# Patient Record
Sex: MALE | Race: WHITE | NOT HISPANIC OR LATINO | Employment: OTHER | URBAN - METROPOLITAN AREA
[De-identification: names, ages, dates, MRNs, and addresses within clinical notes are randomized per-mention and may not be internally consistent; named-entity substitution may affect disease eponyms.]

---

## 2018-02-14 ENCOUNTER — OFFICE VISIT CONVERTED (OUTPATIENT)
Dept: SURGERY | Facility: CLINIC | Age: 63
End: 2018-02-14
Attending: NURSE PRACTITIONER

## 2018-04-10 ENCOUNTER — OFFICE VISIT CONVERTED (OUTPATIENT)
Dept: SURGERY | Facility: CLINIC | Age: 63
End: 2018-04-10
Attending: NURSE PRACTITIONER

## 2021-02-08 ENCOUNTER — OFFICE VISIT CONVERTED (OUTPATIENT)
Dept: SURGERY | Facility: CLINIC | Age: 66
End: 2021-02-08
Attending: NURSE PRACTITIONER

## 2021-02-08 ENCOUNTER — CONVERSION ENCOUNTER (OUTPATIENT)
Dept: SURGERY | Facility: CLINIC | Age: 66
End: 2021-02-08

## 2021-03-09 ENCOUNTER — HOSPITAL ENCOUNTER (OUTPATIENT)
Dept: VACCINE CLINIC | Facility: HOSPITAL | Age: 66
Discharge: HOME | End: 2021-03-09
Attending: INTERNAL MEDICINE

## 2021-03-30 ENCOUNTER — HOSPITAL ENCOUNTER (OUTPATIENT)
Dept: VACCINE CLINIC | Facility: HOSPITAL | Age: 66
Discharge: HOME | End: 2021-03-30
Attending: INTERNAL MEDICINE

## 2021-04-21 ENCOUNTER — HOSPITAL ENCOUNTER (OUTPATIENT)
Dept: PREADMISSION TESTING | Facility: HOSPITAL | Age: 66
Discharge: HOME | End: 2021-04-21
Attending: SURGERY

## 2021-04-21 LAB — SARS-COV-2 RNA SPEC QL NAA+PROBE: NOT DETECTED

## 2021-04-26 ENCOUNTER — HOSPITAL ENCOUNTER (OUTPATIENT)
Dept: GASTROENTEROLOGY | Facility: HOSPITAL | Age: 66
Setting detail: HOSPITAL OUTPATIENT SURGERY
Discharge: HOME | End: 2021-04-26
Attending: SURGERY

## 2021-05-10 NOTE — H&P
History and Physical      Patient Name: Ramon Holman   Patient ID: [id number]   Sex: Male   YOB: 1955    Primary Care Provider: Jason Larkin MD   Referring Provider: Jason Larkin MD    Visit Date: February 8, 2021    Provider: DULCE Cerrato   Location: [facility]   Location Address: [address] [place] [address]  [id number]   Location Phone: [phone]          Chief Complaint  · Age 50 or over  · Here today for a pre-surgical colon screening visit  · Difficulty Swallowing  · GERD  · Requesting EGD and Colonoscopy      History Of Present Illness  The patient is a 66 year old /White male presenting to the Surgical Specialist office on a referral from Jason Larkin MD.   Ramon Holman needs to have a diagnostic colonoscopy and EGD.   Patient states that they have had a colonoscopy. 10 years ago   Patient currently complains of: GERD and difficulty swallowing   Patient Does not have family history of colon cancer.      Patient presents today on referral from Dr. Jason Fuller.  Patient presents today with complaints of dysphagia, GERD and for screening colonoscopy.  Patient reports that he has previously been with some esophageal stenosis, that required some dilatation. Admits to feeling as if food gets stuck in his chest.  Denies any lower abdominal pain, diarrhea, or rectal bleeding.  Denies any family history of colorectal cancer.    3/18: EGD (Jonny): Esophagitis; gastritis.    3/15: EGD with dilation to 20mm - Stomach - mild reactive gastropathy.    4/12: EGD with dilation to 18mm - Gastric antrum - Slight reactive gastropathy with focal chronic inflammation; Gastric ulcer.     1/11: EGD & Colonoscopy (Jonny): Gastric Antrum - mild chronic reactive gastropathy. Normal colon.     2/04: EGD & Colonoscopy (Jonny): Gastritis; Diverticulosis.    6/03: EGD with removal of foreign body- food impaction removal & dilation of esophagus.       Past Medical  History  Disease Name Date Onset Notes   ***No Significant Medical History --  --    Arthritis --  --    High cholesterol --  --    Prostate Disorder --  --          Past Surgical History  Procedure Name Date Notes   Appendectomy --  --    Colonoscopy --  --    endoscopy 2015 --          Medication List  Name Date Started Instructions   aspirin 81 mg oral tablet,delayed release (DR/EC)  --    fenofibrate oral  --    Fish Oil oral  --    lovastatin 20 mg oral tablet  --    melatonin 5 mg oral capsule  --    multivitamin oral tablet  take 1 tablet by oral route daily   omeprazole 40 mg oral capsule,delayed release(DR/EC) 04/10/2018 take 1 capsule (40 mg) by oral route once daily before a meal for 30 days   Osteo Bi-Flex 250-200 mg oral tablet  --    Vitamin D2 50,000 unit oral capsule  --          Allergy List  Allergen Name Date Reaction Notes   NO KNOWN DRUG ALLERGIES --  --  --        Allergies Reconciled  Family Medical History  Disease Name Relative/Age Notes   -None  --    Family history of colon cancer Grandmother (maternal)/80s   Uncle; Grandmother (maternal)/80s         Social History  Finding Status Start/Stop Quantity Notes   Alcohol Never --/-- --  drinks no   Caffeine Current every day --/-- --  drinks regularly; 1-2 times per day  drinks regularly; coffee, tea and soft drinks; 3-4 times per day   Second hand smoke exposure Never --/-- --  no   Tobacco Never --/-- --  never a smoker         Review of Systems  · Constitutional  o Denies  o : fever, chills  · Eyes  o Denies  o : yellowish discoloration of eyes  · HENT  o Denies  o : difficulty swallowing  · Cardiovascular  o Denies  o : chest pain, chest pain on exertion  · Respiratory  o Denies  o : shortness of breath  · Gastrointestinal  o Admits  o : dysphagia, heartburn, reflux  o Denies  o : nausea, vomiting, diarrhea, constipation  · Genitourinary  o Denies  o : abnormal color of urine  · Integument  o Denies  o : rash  · Neurologic  o Denies  o :  tingling or numbness  · Musculoskeletal  o Denies  o : joint pain  · Endocrine  o Denies  o : weight gain, weight loss      Vitals  Date Time BP Position Site L\R Cuff Size HR RR TEMP (F) WT  HT  BMI kg/m2 BSA m2 O2 Sat FR L/min FiO2 HC       02/08/2021 10:00 AM       14  200lbs 0oz 6'   27.12 2.15             Physical Examination  · Constitutional  o Appearance  o : well developed, well-nourished, patient in no apparent distress  · Head and Face  o Head  o :   § Inspection  § : atraumatic, normocephalic  o Face  o :   § Inspection  § : no facial lesions  · Eyes  o Conjunctivae  o : conjunctivae normal  o Sclerae  o : sclerae white  · Neck  o Inspection/Palpation  o : normal appearance, no masses or tenderness, trachea midline  · Respiratory  o Respiratory Effort  o : breathing unlabored  · Skin and Subcutaneous Tissue  o General Inspection  o : no lesions present, no areas of discoloration, skin turgor normal, texture normal  · Neurologic  o Mental Status Examination  o :   § Orientation  § : grossly oriented to person, place and time  § Attention  § : attention normal, concentration abilities normal  § Fund of Knowledge  § : fund of knowledge within normal limits, patient aware of current events  o Gait and Station  o : normal gait, able to stand without difficulty  · Psychiatric  o Judgement and Insight  o : judgment and insight intact  o Mood and Affect  o : mood normal, affect appropriate              Assessment  · Difficulty in swallowing     787.20/R13.10  · GERD (gastroesophageal reflux disease)     530.81/K21.9  · Screening for colon cancer     V76.51/Z12.11  · Pre-op testing     V72.84/Z01.818    Problems Reconciled  Plan  · Orders  o Consent for Colonoscopy Screening-Possible risk/complications, benefits, and alternatives to surgical or invasive procedure have been explained to patient and/or legal guardian. -Patient has been evaluated and can tolerate anesthesia and/or sedation. Risks, benefits, and  alternatives to anesthesia and sedation have been explained to patient and/or legal guardian. () - 787.20/R13.10, 530.81/K21.9, V72.84/Z01.818, V76.51/Z12.11 - 04/26/2021  o Consent for Esophagogastroduodenoscopy (EGD) with dilation - Possible risks/complications, benefits, and alternatives to surgical or invasive procedure have been explained to patient and/or legal guardian. - Patient has been evaluated and can tolerate anesthesia and/or sedation. Risks, benefits, and alternatives to anesthesia and sedation have been explained to patient and/or legal guardian. ([id number]) - 787.20/R13.10, 530.81/K21.9, V72.84/Z01.818, V76.51/Z12.11 - 04/26/2021  o [facility] Covid-19 Screening ([id number]) - V72.84/Z01.818 - 04/21/2021   [address] @ 10am  · Medications  o Medications have been Reconciled  o Transition of Care or Provider Policy  · Instructions  o Surgical Facility: [facility]  o Handouts Provided Pre-Procedure Instructions including date, time, and location of procedure.   o PLAN: Proceeed with colonoscopy. Patient understands risks/benefits and is willing to proceed.   o PLAN: Proceeed with EGD. Patient understands risks/benefits and is willing to proceed.   o ***Surgical Orders***  o RISK AND BENEFITS:  o Given these options, the patient has verbally expressed an understanding of the risks of the surgery and finds these risks acceptable. Will proceed with surgery as soon as possible.  o O.R. PREP: Per protocol   o IV: Per Anesthesia  o Please sign permit for: Colonoscopy with possible biopsies by Dr. Perez.  o Please sign permit for: Esophagogastroduodenoscopy with possible biopsies and dilation by Dr. Perez.  o The above History and Physical Examination has been completed within 30 days of admission.  o ***Patient Status***  o Outpatient  o Follow up in the in the office post procedure.  o Electronically Identified Patient Education Materials Provided  Electronically            Electronically Signed by: DULCE Cerrato -Author on February 8, 2021 12:59:43 PM

## 2021-05-14 ENCOUNTER — OFFICE VISIT CONVERTED (OUTPATIENT)
Dept: SURGERY | Facility: CLINIC | Age: 66
End: 2021-05-14
Attending: NURSE PRACTITIONER

## 2021-05-14 VITALS — RESPIRATION RATE: 14 BRPM | WEIGHT: 200 LBS | BODY MASS INDEX: 27.09 KG/M2 | HEIGHT: 72 IN

## 2021-05-16 VITALS — BODY MASS INDEX: 26.41 KG/M2 | WEIGHT: 195 LBS | HEIGHT: 72 IN | RESPIRATION RATE: 14 BRPM

## 2021-05-16 VITALS — HEIGHT: 72 IN | RESPIRATION RATE: 14 BRPM | WEIGHT: 197.37 LBS | BODY MASS INDEX: 26.73 KG/M2

## 2021-06-05 NOTE — PROGRESS NOTES
Progress Note      Patient Name: Ramon Holman   Patient ID: [id number]   Sex: Male   YOB: 1955    Primary Care Provider: Jason Larkin MD   Referring Provider: Jason Larkin MD    Visit Date: May 14, 2021    Provider: DULCE Cerrato   Location: [facility]   Location Address: [address] [place] [address]  [id number]   Location Phone: [phone]          Chief Complaint  · Follow Up Colonoscopy and EGD      History Of Present Illness  Ramon Holman is a 66 year old /White male who is here to follow up colonoscopy and EGD.      Patient presents today for follow-up visit after undergoing a EGD and colonoscopy on 4/26/2021 performed by Dr. Isma Perez.  Patient with with reflux esophagitis per EGD/pathology.  Patient was with a normal colon per colonoscopy.  Patient denies any postoperative complications.       Past Medical History  Disease Name Date Onset Notes   Arthritis --  --    High cholesterol --  --    Prostate Disorder --  --          Past Surgical History  Procedure Name Date Notes   Appendectomy --  --    Colonoscopy --  --    endoscopy 2015 --          Medication List  Name Date Started Instructions   aspirin 81 mg oral tablet,delayed release (DR/EC)  --    famotidine 40 mg oral tablet 05/14/2021 take 1 tablet (40 mg) by oral route 2 times per day for 30 days   fenofibrate oral  --    Fish Oil oral  --    lovastatin 20 mg oral tablet  --    melatonin 5 mg oral capsule  --    multivitamin oral tablet  take 1 tablet by oral route daily   omeprazole 40 mg oral capsule,delayed release(DR/EC) 04/10/2018 take 1 capsule (40 mg) by oral route once daily before a meal for 30 days   Osteo Bi-Flex 250-200 mg oral tablet  --    Vitamin D2 50,000 unit oral capsule  --          Allergy List  Allergen Name Date Reaction Notes   NO KNOWN DRUG ALLERGIES --  --  --          Family Medical History  Disease Name Relative/Age Notes   -None  --    Family history of  colon cancer Grandmother (maternal)/80s   Uncle; Grandmother (maternal)/80s         Social History  Finding Status Start/Stop Quantity Notes   Alcohol Never --/-- --  drinks no   Caffeine Current every day --/-- --  drinks regularly; 1-2 times per day  drinks regularly; coffee, tea and soft drinks; 3-4 times per day   Second hand smoke exposure Never --/-- --  no   Tobacco Never --/-- --  never a smoker         Review of Systems  · Constitutional  o Denies  o : chills, fever  · Eyes  o Denies  o : yellowish discoloration of eyes  · HENT  o Denies  o : difficulty swallowing  · Cardiovascular  o Denies  o : chest pain on exertion  · Respiratory  o Denies  o : shortness of breath  · Gastrointestinal  o Denies  o : nausea, vomiting, diarrhea, constipation  · Genitourinary  o Denies  o : abnormal color of urine  · Integument  o Denies  o : rash  · Neurologic  o Denies  o : tingling or numbness  · Musculoskeletal  o Denies  o : joint pain  · Endocrine  o Denies  o : weight gain, weight loss      Vitals  Date Time BP Position Site L\R Cuff Size HR RR TEMP (F) WT  HT  BMI kg/m2 BSA m2 O2 Sat FR L/min FiO2        05/14/2021 11:37 AM       14  194lbs 2oz 6'   26.33 2.11             Physical Examination  · Constitutional  o Appearance  o : well developed, well-nourished, patient in no apparent distress  · Head and Face  o Head  o :   § Inspection  § : atraumatic, normocephalic  o Face  o :   § Inspection  § : no facial lesions  · Eyes  o Conjunctivae  o : conjunctivae normal  o Sclerae  o : sclerae white  · Neck  o Inspection/Palpation  o : normal appearance, no masses or tenderness, trachea midline  · Respiratory  o Respiratory Effort  o : breathing unlabored  · Skin and Subcutaneous Tissue  o General Inspection  o : no lesions present, no areas of discoloration, skin turgor normal, texture normal  · Neurologic  o Mental Status Examination  o :   § Orientation  § : grossly oriented to person, place and  time  § Attention  § : attention normal, concentration abilities normal  § Fund of Knowledge  § : fund of knowledge within normal limits, patient aware of current events  o Gait and Station  o : normal gait, able to stand without difficulty  · Psychiatric  o Judgement and Insight  o : judgment and insight intact  o Mood and Affect  o : mood normal, affect appropriate          Assessment  · S/P endoscopy     V45.89/Z98.890  · S/P colonoscopy     V45.89/Z98.890  · Reflux esophagitis     530.11/K21.00    Problems Reconciled  Plan  · Medications  o Medications have been Reconciled  o Transition of Care or Provider Policy  · Instructions  o Per the AGA guidelines of 2012 patient is to follow up for colonoscopy surveillance  o Rescreen: In 10 years follow-up in the interim as needed.  o Patient is concerned about increasing his omeprazole to 40 mg daily due to kidney function. I will try to add Pepcid to and use a dual therapy to try and control his GERD symptoms.   o Electronically Identified Patient Education Materials Provided Electronically  · Disposition  o Call or Return if symptoms worsen or persist.  o EMR dragon/transcription disclaimer: Much of this encounter note is an electronic transcription/translation of spoken language to printed text. Electronic translation of spoken language may permit erroneous, or at times nonsensical words or phrases to be inadvertently trasncribed; although I have reviewed the note for such errors, some may still exist.            Electronically Signed by: DULCE Cerrato -Author on May 14, 2021 01:32:20 PM

## 2021-07-15 VITALS — RESPIRATION RATE: 14 BRPM | BODY MASS INDEX: 26.29 KG/M2 | HEIGHT: 72 IN | WEIGHT: 194.12 LBS

## 2022-03-02 ENCOUNTER — TRANSCRIBE ORDERS (OUTPATIENT)
Dept: GENERAL RADIOLOGY | Facility: HOSPITAL | Age: 67
End: 2022-03-02

## 2022-03-02 ENCOUNTER — HOSPITAL ENCOUNTER (OUTPATIENT)
Dept: GENERAL RADIOLOGY | Facility: HOSPITAL | Age: 67
Discharge: HOME | End: 2022-03-02
Admitting: FAMILY MEDICINE

## 2022-03-02 DIAGNOSIS — M25.50 ARTHRALGIA, UNSPECIFIED JOINT: ICD-10-CM

## 2022-03-02 DIAGNOSIS — M25.50 ARTHRALGIA, UNSPECIFIED JOINT: Primary | ICD-10-CM

## 2022-03-02 PROCEDURE — 73600 X-RAY EXAM OF ANKLE: CPT

## 2022-11-03 ENCOUNTER — TRANSCRIBE ORDERS (OUTPATIENT)
Dept: ADMINISTRATIVE | Facility: HOSPITAL | Age: 67
End: 2022-11-03

## 2022-11-03 ENCOUNTER — HOSPITAL ENCOUNTER (OUTPATIENT)
Dept: GENERAL RADIOLOGY | Facility: HOSPITAL | Age: 67
Discharge: HOME | End: 2022-11-03

## 2022-11-03 DIAGNOSIS — M19.93 SECONDARY OSTEOARTHRITIS: Primary | ICD-10-CM

## 2022-11-03 DIAGNOSIS — M19.93 SECONDARY OSTEOARTHRITIS: ICD-10-CM

## 2022-11-03 PROCEDURE — 73562 X-RAY EXAM OF KNEE 3: CPT

## 2022-11-14 ENCOUNTER — PREP FOR SURGERY (OUTPATIENT)
Dept: OTHER | Facility: HOSPITAL | Age: 67
End: 2022-11-14

## 2022-11-14 ENCOUNTER — OFFICE VISIT (OUTPATIENT)
Dept: ORTHOPEDIC SURGERY | Facility: CLINIC | Age: 67
End: 2022-11-14

## 2022-11-14 VITALS — HEIGHT: 72 IN | WEIGHT: 194 LBS | BODY MASS INDEX: 26.28 KG/M2

## 2022-11-14 DIAGNOSIS — M17.0 BILATERAL PRIMARY OSTEOARTHRITIS OF KNEE: Primary | ICD-10-CM

## 2022-11-14 PROBLEM — M17.12 OSTEOARTHROSIS, LOCALIZED, PRIMARY, KNEE, LEFT: Status: ACTIVE | Noted: 2022-11-14

## 2022-11-14 PROCEDURE — 99204 OFFICE O/P NEW MOD 45 MIN: CPT | Performed by: ORTHOPAEDIC SURGERY

## 2022-11-14 RX ORDER — TRANEXAMIC ACID 10 MG/ML
1000 INJECTION, SOLUTION INTRAVENOUS ONCE
Status: CANCELLED | OUTPATIENT
Start: 2022-11-14 | End: 2022-11-14

## 2022-11-14 RX ORDER — POVIDONE-IODINE 10 MG/ML
SOLUTION TOPICAL ONCE
Status: CANCELLED | OUTPATIENT
Start: 2022-11-14 | End: 2022-11-14

## 2022-11-14 RX ORDER — FAMOTIDINE 40 MG/1
40 TABLET, FILM COATED ORAL DAILY
COMMUNITY
Start: 2022-08-28

## 2022-11-14 RX ORDER — CEFAZOLIN SODIUM IN 0.9 % NACL 3 G/100 ML
3 INTRAVENOUS SOLUTION, PIGGYBACK (ML) INTRAVENOUS ONCE
Status: CANCELLED | OUTPATIENT
Start: 2022-11-14 | End: 2022-11-14

## 2022-11-14 RX ORDER — LOVASTATIN 20 MG/1
20 TABLET ORAL NIGHTLY
COMMUNITY
Start: 2022-09-01

## 2022-11-14 RX ORDER — CEFAZOLIN SODIUM 2 G/100ML
2 INJECTION, SOLUTION INTRAVENOUS ONCE
Status: CANCELLED | OUTPATIENT
Start: 2022-11-14 | End: 2022-11-14

## 2022-11-14 RX ORDER — FENOFIBRATE 134 MG/1
134 CAPSULE ORAL NIGHTLY
COMMUNITY
Start: 2022-09-01

## 2022-11-14 RX ORDER — OMEPRAZOLE 20 MG/1
20 CAPSULE, DELAYED RELEASE ORAL NIGHTLY
COMMUNITY
Start: 2022-09-01

## 2022-11-14 NOTE — PROGRESS NOTES
"Chief Complaint  Initial Evaluation of the Left Knee and Initial Evaluation of the Right Knee     Subjective      Ramon Holman presents to [facility] for initial evaluation of bilateral knees. The patient has had pain for several years.  The patient enjoys working in his garage and having a hard time with static standing activities.  The patient has more trouble with the left knee as the X-ray report shows severe arthritis on the medial side of the knee.  The patient notes ROM in bilateral knees also.  No known injury.     No Known Allergies     Social History     Socioeconomic History   • Marital status:    Tobacco Use   • Smoking status: Never        Review of Systems     Objective   Vital Signs:   Ht 182.9 cm (72\")   Wt 88 kg (194 lb)   BMI 26.31 kg/m²       Physical Exam  Constitutional:       Appearance: Normal appearance. Patient is well-developed and normal weight.   HENT:      Head: Normocephalic.      Right Ear: Hearing and external ear normal.      Left Ear: Hearing and external ear normal.      Nose: Nose normal.   Eyes:      Conjunctiva/sclera: Conjunctivae normal.   Cardiovascular:      Rate and Rhythm: Normal rate.   Pulmonary:      Effort: Pulmonary effort is normal.      Breath sounds: No wheezing or rales.   Abdominal:      Palpations: Abdomen is soft.      Tenderness: There is no abdominal tenderness.   Musculoskeletal:      Cervical back: Normal range of motion.   Skin:     Findings: No rash.   Neurological:      Mental Status: Patient  is alert and oriented to person, place, and time.   Psychiatric:         Mood and Affect: Mood and affect normal.         Judgment: Judgment normal.       Ortho Exam      LEFT KNEE -8 - 110. Dorsal pedal pulse 2+. Posterior tibialis pulse is 2+. Good strength to hamstrings, quadriceps, dorsiflexors, and plantar flexors. Good tone of hip flexors, hip extensors, and hip abductors. Calf supple.  Sensation intact. Neurovascular " Intact. Slight swelling.     RIGHT KNEE -2 - 110 Dorsal pedal pulse 2+. Posterior tibialis pulse is 2+. Good strength to hamstrings, quadriceps, dorsiflexors, and plantar flexors. Calf supple. Sensation intact. Neurovascular Intact. Slight swelling. Sensation intact. Neurovascular Intact.     Procedures        Imaging Results (Most Recent)     None           Result Review :         XR Knee 3 View Left    Result Date: 11/3/2022  Narrative: PROCEDURE: XR KNEE 3 VW LEFT  COMPARISON: None  INDICATIONS: SECONDARY OSTEOARTHRITIS. CHRONIC BILATERAL KNEE PAIN.  FINDINGS:  Marked degenerative change consistent with osteoarthritis is seen in the medial and patellofemoral compartments.  Moderate degenerative changes seen in the lateral compartment.  No fractures are visualized.  No lytic or sclerotic bone lesions are seen.  A small joint effusion is seen.      Impression:   Left knee series demonstrating moderate to marked tricompartmental degenerative change consistent with osteoarthritis.  Small joint effusion.      SUPRIYA ROUSSEAU MD       Electronically Signed and Approved By: SUPRIYA ROUSSEAU MD on 11/03/2022 at 12:17             XR Knee 3 View Right    Result Date: 11/3/2022  Narrative: PROCEDURE: XR KNEE 3 VW RIGHT  COMPARISON: None  INDICATIONS: SECONDARY OSTEOARTHRITIS. CHRONIC BILATERAL KNEE PAIN.  FINDINGS:  Moderate tricompartmental degenerative changes consistent with osteoarthritis.  No fractures are visualized.  No lytic or sclerotic bone lesions are seen.  A small joint effusion is seen.      Impression:   Right knee series demonstrating moderate tricompartmental degenerative change consistent with osteoarthritis.  Small joint effusion.      SUPRIYA ROUSSEAU MD       Electronically Signed and Approved By: SUPRIYA ROUSSEAU MD on 11/03/2022 at 12:18                      Assessment and Plan     Diagnoses and all orders for this visit:    1. Bilateral primary osteoarthritis of knee (Primary)        Discussed the treatment  plan with the patient. Discussed the treatment options with the patient, operative vs non-operative. Discussed conservative measures as exercises, anti-inflammatory and injection. Discussed bilateral knee surgery but then decided to do one at a time.     Educated on risk of smoking. Discussed options for smoking cessation.  Discussed surgery., Risks/benefits discussed with patient including, but not limited to: infection, bleeding, neurovascular damage, malunion, nonunion, aesthetic deformity, need for further surgery, and death. and Surgery pamphlet given.    Follow Up     Postoperatively       Patient was given instructions and counseling regarding his condition or for health maintenance advice. Please see specific information pulled into the AVS if appropriate.     Scribed for Vladimir Vanegas MD by Tanya Casillas MA.  11/14/22   13:24 EST    I have personally performed the services described in this document as scribed by the above individual and it is both accurate and complete. Vladimir Vanegas MD 11/14/22

## 2022-11-16 ENCOUNTER — TELEPHONE (OUTPATIENT)
Dept: ORTHOPEDIC SURGERY | Facility: CLINIC | Age: 67
End: 2022-11-16

## 2022-11-16 NOTE — TELEPHONE ENCOUNTER
PATIENT WAS CALLED AND INFORMED THAT PER DR RIVERA THAT HE COULD HOLD/STOP ASPIRIN FOR 7 DAYS PRIOR TO SURGERY.  PATIENT VOICES UNDERSTANDING.

## 2022-12-08 DIAGNOSIS — Z96.652 AFTERCARE FOLLOWING LEFT KNEE JOINT REPLACEMENT SURGERY: Primary | ICD-10-CM

## 2022-12-08 DIAGNOSIS — Z47.1 AFTERCARE FOLLOWING LEFT KNEE JOINT REPLACEMENT SURGERY: Primary | ICD-10-CM

## 2023-01-04 ENCOUNTER — PRE-ADMISSION TESTING (OUTPATIENT)
Dept: PREADMISSION TESTING | Facility: HOSPITAL | Age: 68
End: 2023-01-04
Payer: MEDICARE

## 2023-01-04 VITALS
HEIGHT: 72 IN | HEART RATE: 74 BPM | WEIGHT: 190.26 LBS | TEMPERATURE: 97.8 F | BODY MASS INDEX: 25.77 KG/M2 | OXYGEN SATURATION: 95 % | DIASTOLIC BLOOD PRESSURE: 84 MMHG | RESPIRATION RATE: 16 BRPM | SYSTOLIC BLOOD PRESSURE: 140 MMHG

## 2023-01-04 DIAGNOSIS — M17.0 BILATERAL PRIMARY OSTEOARTHRITIS OF KNEE: ICD-10-CM

## 2023-01-04 LAB
ALBUMIN SERPL-MCNC: 4 G/DL (ref 3.5–5.2)
ALBUMIN/GLOB SERPL: 1.5 G/DL
ALP SERPL-CCNC: 43 U/L (ref 39–117)
ALT SERPL W P-5'-P-CCNC: 14 U/L (ref 1–41)
ANION GAP SERPL CALCULATED.3IONS-SCNC: 9.6 MMOL/L (ref 5–15)
AST SERPL-CCNC: 21 U/L (ref 1–40)
BASOPHILS # BLD AUTO: 0.08 10*3/MM3 (ref 0–0.2)
BASOPHILS NFR BLD AUTO: 1.5 % (ref 0–1.5)
BILIRUB SERPL-MCNC: 0.4 MG/DL (ref 0–1.2)
BUN SERPL-MCNC: 21 MG/DL (ref 8–23)
BUN/CREAT SERPL: 15.8 (ref 7–25)
CALCIUM SPEC-SCNC: 9 MG/DL (ref 8.6–10.5)
CHLORIDE SERPL-SCNC: 105 MMOL/L (ref 98–107)
CO2 SERPL-SCNC: 21.4 MMOL/L (ref 22–29)
CREAT SERPL-MCNC: 1.33 MG/DL (ref 0.76–1.27)
DEPRECATED RDW RBC AUTO: 41.4 FL (ref 37–54)
EGFRCR SERPLBLD CKD-EPI 2021: 57.9 ML/MIN/1.73
EOSINOPHIL # BLD AUTO: 0.51 10*3/MM3 (ref 0–0.4)
EOSINOPHIL NFR BLD AUTO: 9.7 % (ref 0.3–6.2)
ERYTHROCYTE [DISTWIDTH] IN BLOOD BY AUTOMATED COUNT: 13.2 % (ref 12.3–15.4)
GLOBULIN UR ELPH-MCNC: 2.6 GM/DL
GLUCOSE SERPL-MCNC: 113 MG/DL (ref 65–99)
HBA1C MFR BLD: 6 % (ref 4.8–5.6)
HCT VFR BLD AUTO: 41.9 % (ref 37.5–51)
HGB BLD-MCNC: 14.5 G/DL (ref 13–17.7)
IMM GRANULOCYTES # BLD AUTO: 0.01 10*3/MM3 (ref 0–0.05)
IMM GRANULOCYTES NFR BLD AUTO: 0.2 % (ref 0–0.5)
INR PPP: 1.09 (ref 0.86–1.15)
LYMPHOCYTES # BLD AUTO: 2.02 10*3/MM3 (ref 0.7–3.1)
LYMPHOCYTES NFR BLD AUTO: 38.3 % (ref 19.6–45.3)
MCH RBC QN AUTO: 29.7 PG (ref 26.6–33)
MCHC RBC AUTO-ENTMCNC: 34.6 G/DL (ref 31.5–35.7)
MCV RBC AUTO: 85.9 FL (ref 79–97)
MONOCYTES # BLD AUTO: 0.45 10*3/MM3 (ref 0.1–0.9)
MONOCYTES NFR BLD AUTO: 8.5 % (ref 5–12)
NEUTROPHILS NFR BLD AUTO: 2.21 10*3/MM3 (ref 1.7–7)
NEUTROPHILS NFR BLD AUTO: 41.8 % (ref 42.7–76)
NRBC BLD AUTO-RTO: 0 /100 WBC (ref 0–0.2)
PLATELET # BLD AUTO: 246 10*3/MM3 (ref 140–450)
PMV BLD AUTO: 8.8 FL (ref 6–12)
POTASSIUM SERPL-SCNC: 3.9 MMOL/L (ref 3.5–5.2)
PROT SERPL-MCNC: 6.6 G/DL (ref 6–8.5)
PROTHROMBIN TIME: 14.2 SECONDS (ref 11.8–14.9)
RBC # BLD AUTO: 4.88 10*6/MM3 (ref 4.14–5.8)
SODIUM SERPL-SCNC: 136 MMOL/L (ref 136–145)
WBC NRBC COR # BLD: 5.28 10*3/MM3 (ref 3.4–10.8)

## 2023-01-04 PROCEDURE — 85610 PROTHROMBIN TIME: CPT

## 2023-01-04 PROCEDURE — 36415 COLL VENOUS BLD VENIPUNCTURE: CPT

## 2023-01-04 PROCEDURE — 83036 HEMOGLOBIN GLYCOSYLATED A1C: CPT

## 2023-01-04 PROCEDURE — 93010 ELECTROCARDIOGRAM REPORT: CPT | Performed by: INTERNAL MEDICINE

## 2023-01-04 PROCEDURE — 85025 COMPLETE CBC W/AUTO DIFF WBC: CPT

## 2023-01-04 PROCEDURE — 93005 ELECTROCARDIOGRAM TRACING: CPT

## 2023-01-04 PROCEDURE — 80053 COMPREHEN METABOLIC PANEL: CPT

## 2023-01-04 RX ORDER — MULTIVIT-MIN/IRON/FOLIC ACID/K 18-600-40
1 CAPSULE ORAL DAILY
COMMUNITY

## 2023-01-04 RX ORDER — ASPIRIN 81 MG/1
1 TABLET ORAL DAILY
COMMUNITY
End: 2023-01-11 | Stop reason: HOSPADM

## 2023-01-04 RX ORDER — OMEGA-3 FATTY ACIDS/FISH OIL 300-1000MG
1 CAPSULE ORAL DAILY
COMMUNITY
End: 2023-01-11 | Stop reason: HOSPADM

## 2023-01-04 RX ORDER — CHOLECALCIFEROL (VITAMIN D3) 125 MCG
5 CAPSULE ORAL
COMMUNITY

## 2023-01-04 RX ORDER — HYDROCORTISONE ACETATE 0.5 %
CREAM (GRAM) TOPICAL
COMMUNITY

## 2023-01-04 RX ORDER — THIAMINE HCL 100 MG
1 TABLET ORAL DAILY
COMMUNITY

## 2023-01-04 RX ORDER — MULTIPLE VITAMINS W/ MINERALS TAB 9MG-400MCG
1 TAB ORAL DAILY
COMMUNITY

## 2023-01-04 NOTE — SIGNIFICANT NOTE
WALKER TO BE DELIVERED TO [facility]. PT PLANS TO GO TO OUT PT TO [facility], PT HAS TRANSPORTATION.

## 2023-01-04 NOTE — SIGNIFICANT NOTE
PT AND WIFE ATTENDED THE TOTAL  JOINT CLASS. EDUCATIONAL MATERIAL AND SURGICAL SOAP GIVEN. UNDERSTANDING VERBALIZED.

## 2023-01-04 NOTE — DISCHARGE INSTRUCTIONS
IMPORTANT INSTRUCTIONS - PRE-ADMISSION TESTING  DO NOT EAT OR CHEW anything after midnight the night before your procedure.    You may have CLEAR liquids up to _____3_ hours prior to ARRIVAL time. GATORADE, 20 OZ, NO RED  Take the following medications the morning of your procedure with JUST A SIP OF WATER:  ___FAMOTIDINE____________________________________________________________________________________________________________________________________________________________________________________    DO NOT BRING your medications to the hospital with you, UNLESS something has changed since your PRE-Admission Testing appointment.  Hold all vitamins, supplements, and NSAIDS (Non- steroidal anti-inflammatory meds) for one week prior to surgery (you MAY take Tylenol or Acetaminophen). HOLD NOW  If you are diabetic, check your blood sugar the morning of your procedure. If it is less than 70 or if you are feeling symptomatic, call the following number for further instructions: [phone] SAME DAY SURGERY WILL CALL ARRIVAL TIME 1/9/23 BY 4 P.M.______.  Use your inhalers/nebulizers as usual, the morning of your procedure. BRING YOUR INHALERS with you. NA  Bring your CPAP or BIPAP to hospital, ONLY IF YOU WILL BE SPENDING THE NIGHT. NA  Make sure you have a ride home and have someone who will stay with you the day of your procedure after you go home.  If you have any questions, please call your Pre-Admission Testing Nurse, _______MAURA_________ at [phone]___________.   Per anesthesia request, do not smoke for 24 hours before your procedure or as instructed by your surgeon.  NA  SHOWER AS SCHEDULED AND DIRECTED ON PAGE 9 OF TOTAL JOINT WORK BOOK AND BRING WORKBOOK WITH YOU THE DAY OF SURGERY.

## 2023-01-05 ENCOUNTER — ANESTHESIA EVENT (OUTPATIENT)
Dept: PERIOP | Facility: HOSPITAL | Age: 68
End: 2023-01-05
Payer: MEDICARE

## 2023-01-05 LAB — QT INTERVAL: 421 MS

## 2023-01-10 ENCOUNTER — HOSPITAL ENCOUNTER (OUTPATIENT)
Facility: HOSPITAL | Age: 68
Discharge: HOME | End: 2023-01-11
Attending: ORTHOPAEDIC SURGERY | Admitting: ORTHOPAEDIC SURGERY
Payer: MEDICARE

## 2023-01-10 ENCOUNTER — APPOINTMENT (OUTPATIENT)
Dept: GENERAL RADIOLOGY | Facility: HOSPITAL | Age: 68
End: 2023-01-10
Payer: MEDICARE

## 2023-01-10 ENCOUNTER — ANESTHESIA (OUTPATIENT)
Dept: PERIOP | Facility: HOSPITAL | Age: 68
End: 2023-01-10
Payer: MEDICARE

## 2023-01-10 DIAGNOSIS — R26.2 DIFFICULTY IN WALKING: ICD-10-CM

## 2023-01-10 DIAGNOSIS — M17.0 BILATERAL PRIMARY OSTEOARTHRITIS OF KNEE: ICD-10-CM

## 2023-01-10 DIAGNOSIS — M17.12 OSTEOARTHROSIS, LOCALIZED, PRIMARY, KNEE, LEFT: Primary | ICD-10-CM

## 2023-01-10 DIAGNOSIS — Z78.9 DECREASED ACTIVITIES OF DAILY LIVING (ADL): ICD-10-CM

## 2023-01-10 PROCEDURE — 25010000002 ROPIVACAINE PER 1 MG: Performed by: ORTHOPAEDIC SURGERY

## 2023-01-10 PROCEDURE — C1776 JOINT DEVICE (IMPLANTABLE): HCPCS | Performed by: ORTHOPAEDIC SURGERY

## 2023-01-10 PROCEDURE — A9270 NON-COVERED ITEM OR SERVICE: HCPCS | Performed by: NURSE ANESTHETIST, CERTIFIED REGISTERED

## 2023-01-10 PROCEDURE — 63710000001 ACETAMINOPHEN 500 MG TABLET: Performed by: ORTHOPAEDIC SURGERY

## 2023-01-10 PROCEDURE — 63710000001 OXYCODONE 5 MG TABLET: Performed by: NURSE ANESTHETIST, CERTIFIED REGISTERED

## 2023-01-10 PROCEDURE — 25010000002 FENTANYL CITRATE (PF) 50 MCG/ML SOLUTION: Performed by: NURSE ANESTHETIST, CERTIFIED REGISTERED

## 2023-01-10 PROCEDURE — 0 MEPERIDINE PER 100 MG: Performed by: NURSE ANESTHETIST, CERTIFIED REGISTERED

## 2023-01-10 PROCEDURE — 20985 CPTR-ASST DIR MS PX: CPT | Performed by: ORTHOPAEDIC SURGERY

## 2023-01-10 PROCEDURE — 63710000001 GABAPENTIN 300 MG CAPSULE: Performed by: ANESTHESIOLOGY

## 2023-01-10 PROCEDURE — 25010000002 CEFAZOLIN IN DEXTROSE 2-4 GM/100ML-% SOLUTION: Performed by: ORTHOPAEDIC SURGERY

## 2023-01-10 PROCEDURE — A9270 NON-COVERED ITEM OR SERVICE: HCPCS | Performed by: ANESTHESIOLOGY

## 2023-01-10 PROCEDURE — 25010000002 KETOROLAC TROMETHAMINE PER 15 MG: Performed by: ORTHOPAEDIC SURGERY

## 2023-01-10 PROCEDURE — 25010000002 MORPHINE SULFATE 10 MG/ML SOLUTION: Performed by: ORTHOPAEDIC SURGERY

## 2023-01-10 PROCEDURE — 63710000001 ACETAMINOPHEN 500 MG TABLET: Performed by: ANESTHESIOLOGY

## 2023-01-10 PROCEDURE — C1713 ANCHOR/SCREW BN/BN,TIS/BN: HCPCS | Performed by: ORTHOPAEDIC SURGERY

## 2023-01-10 PROCEDURE — 25010000002 DEXAMETHASONE PER 1 MG: Performed by: NURSE ANESTHETIST, CERTIFIED REGISTERED

## 2023-01-10 PROCEDURE — 94799 UNLISTED PULMONARY SVC/PX: CPT

## 2023-01-10 PROCEDURE — S0260 H&P FOR SURGERY: HCPCS | Performed by: ORTHOPAEDIC SURGERY

## 2023-01-10 PROCEDURE — 63710000001 CELECOXIB 100 MG CAPSULE: Performed by: ANESTHESIOLOGY

## 2023-01-10 PROCEDURE — 27447 TOTAL KNEE ARTHROPLASTY: CPT | Performed by: ORTHOPAEDIC SURGERY

## 2023-01-10 PROCEDURE — 25010000002 EPINEPHRINE 1 MG/ML SOLUTION: Performed by: ORTHOPAEDIC SURGERY

## 2023-01-10 PROCEDURE — 25010000002 ONDANSETRON PER 1 MG: Performed by: NURSE ANESTHETIST, CERTIFIED REGISTERED

## 2023-01-10 PROCEDURE — 0 HYDROMORPHONE 1 MG/ML SOLUTION: Performed by: NURSE ANESTHETIST, CERTIFIED REGISTERED

## 2023-01-10 PROCEDURE — 73560 X-RAY EXAM OF KNEE 1 OR 2: CPT

## 2023-01-10 PROCEDURE — 25010000002 PROPOFOL 10 MG/ML EMULSION: Performed by: NURSE ANESTHETIST, CERTIFIED REGISTERED

## 2023-01-10 PROCEDURE — A9270 NON-COVERED ITEM OR SERVICE: HCPCS | Performed by: ORTHOPAEDIC SURGERY

## 2023-01-10 PROCEDURE — 94761 N-INVAS EAR/PLS OXIMETRY MLT: CPT

## 2023-01-10 PROCEDURE — 25010000002 MIDAZOLAM PER 1 MG: Performed by: ANESTHESIOLOGY

## 2023-01-10 DEVICE — STEM TIB/KN PERSONA CMT 5D SZF LT: Type: IMPLANTABLE DEVICE | Site: KNEE | Status: FUNCTIONAL

## 2023-01-10 DEVICE — COMP FEM/KN PERSONA CR CMT COCR STD SZ11 LT: Type: IMPLANTABLE DEVICE | Site: KNEE | Status: FUNCTIONAL

## 2023-01-10 DEVICE — IMPLANTABLE DEVICE: Type: IMPLANTABLE DEVICE | Site: KNEE | Status: FUNCTIONAL

## 2023-01-10 DEVICE — CMT BONE PALACOS R HI/VISC 1X40: Type: IMPLANTABLE DEVICE | Site: KNEE | Status: FUNCTIONAL

## 2023-01-10 DEVICE — ART/SRF KN PERSONA/VE PS EF 8TO11 10MM LT: Type: IMPLANTABLE DEVICE | Site: KNEE | Status: FUNCTIONAL

## 2023-01-10 DEVICE — CAP TOTL KN CMT PRIMARY W/ROSA: Type: IMPLANTABLE DEVICE | Site: KNEE | Status: FUNCTIONAL

## 2023-01-10 RX ORDER — ONDANSETRON 2 MG/ML
4 INJECTION INTRAMUSCULAR; INTRAVENOUS ONCE AS NEEDED
Status: DISCONTINUED | OUTPATIENT
Start: 2023-01-10 | End: 2023-01-10 | Stop reason: HOSPADM

## 2023-01-10 RX ORDER — PROMETHAZINE HYDROCHLORIDE 25 MG/1
25 SUPPOSITORY RECTAL ONCE AS NEEDED
Status: DISCONTINUED | OUTPATIENT
Start: 2023-01-10 | End: 2023-01-10 | Stop reason: HOSPADM

## 2023-01-10 RX ORDER — TRANEXAMIC ACID 10 MG/ML
1000 INJECTION, SOLUTION INTRAVENOUS ONCE
Status: DISCONTINUED | OUTPATIENT
Start: 2023-01-10 | End: 2023-01-10 | Stop reason: HOSPADM

## 2023-01-10 RX ORDER — CEFAZOLIN SODIUM 2 G/100ML
2 INJECTION, SOLUTION INTRAVENOUS ONCE
Status: COMPLETED | OUTPATIENT
Start: 2023-01-10 | End: 2023-01-10

## 2023-01-10 RX ORDER — MAGNESIUM HYDROXIDE 1200 MG/15ML
LIQUID ORAL AS NEEDED
Status: DISCONTINUED | OUTPATIENT
Start: 2023-01-10 | End: 2023-01-10 | Stop reason: HOSPADM

## 2023-01-10 RX ORDER — NALOXONE HCL 0.4 MG/ML
0.4 VIAL (ML) INJECTION
Status: DISCONTINUED | OUTPATIENT
Start: 2023-01-10 | End: 2023-01-11 | Stop reason: HOSPADM

## 2023-01-10 RX ORDER — GLYCOPYRROLATE 0.2 MG/ML
0.2 INJECTION INTRAMUSCULAR; INTRAVENOUS
Status: COMPLETED | OUTPATIENT
Start: 2023-01-10 | End: 2023-01-10

## 2023-01-10 RX ORDER — CHOLECALCIFEROL (VITAMIN D3) 125 MCG
5 CAPSULE ORAL NIGHTLY PRN
Status: DISCONTINUED | OUTPATIENT
Start: 2023-01-10 | End: 2023-01-11 | Stop reason: HOSPADM

## 2023-01-10 RX ORDER — BISACODYL 5 MG/1
10 TABLET, DELAYED RELEASE ORAL DAILY PRN
Status: DISCONTINUED | OUTPATIENT
Start: 2023-01-10 | End: 2023-01-11 | Stop reason: HOSPADM

## 2023-01-10 RX ORDER — BISACODYL 10 MG
10 SUPPOSITORY, RECTAL RECTAL DAILY PRN
Status: DISCONTINUED | OUTPATIENT
Start: 2023-01-10 | End: 2023-01-11 | Stop reason: HOSPADM

## 2023-01-10 RX ORDER — AMOXICILLIN 250 MG
2 CAPSULE ORAL 2 TIMES DAILY PRN
Status: DISCONTINUED | OUTPATIENT
Start: 2023-01-10 | End: 2023-01-11 | Stop reason: HOSPADM

## 2023-01-10 RX ORDER — ACETAMINOPHEN 500 MG
1000 TABLET ORAL EVERY 6 HOURS
Status: DISCONTINUED | OUTPATIENT
Start: 2023-01-10 | End: 2023-01-11 | Stop reason: HOSPADM

## 2023-01-10 RX ORDER — FENTANYL CITRATE 50 UG/ML
INJECTION, SOLUTION INTRAMUSCULAR; INTRAVENOUS AS NEEDED
Status: DISCONTINUED | OUTPATIENT
Start: 2023-01-10 | End: 2023-01-10 | Stop reason: SURG

## 2023-01-10 RX ORDER — BUPIVACAINE HYDROCHLORIDE AND EPINEPHRINE 5; 5 MG/ML; UG/ML
INJECTION, SOLUTION EPIDURAL; INTRACAUDAL; PERINEURAL
Status: COMPLETED | OUTPATIENT
Start: 2023-01-10 | End: 2023-01-10

## 2023-01-10 RX ORDER — ACETAMINOPHEN 500 MG
1000 TABLET ORAL ONCE
Status: COMPLETED | OUTPATIENT
Start: 2023-01-10 | End: 2023-01-10

## 2023-01-10 RX ORDER — DEXAMETHASONE SODIUM PHOSPHATE 4 MG/ML
INJECTION, SOLUTION INTRA-ARTICULAR; INTRALESIONAL; INTRAMUSCULAR; INTRAVENOUS; SOFT TISSUE AS NEEDED
Status: DISCONTINUED | OUTPATIENT
Start: 2023-01-10 | End: 2023-01-10 | Stop reason: SURG

## 2023-01-10 RX ORDER — PHENYLEPHRINE HCL IN 0.9% NACL 1 MG/10 ML
SYRINGE (ML) INTRAVENOUS AS NEEDED
Status: DISCONTINUED | OUTPATIENT
Start: 2023-01-10 | End: 2023-01-10 | Stop reason: SURG

## 2023-01-10 RX ORDER — CEFAZOLIN SODIUM 2 G/100ML
2 INJECTION, SOLUTION INTRAVENOUS EVERY 8 HOURS
Status: COMPLETED | OUTPATIENT
Start: 2023-01-10 | End: 2023-01-11

## 2023-01-10 RX ORDER — KETOROLAC TROMETHAMINE 15 MG/ML
15 INJECTION, SOLUTION INTRAMUSCULAR; INTRAVENOUS EVERY 6 HOURS
Status: COMPLETED | OUTPATIENT
Start: 2023-01-10 | End: 2023-01-11

## 2023-01-10 RX ORDER — MIDAZOLAM HYDROCHLORIDE 1 MG/ML
2 INJECTION INTRAMUSCULAR; INTRAVENOUS ONCE
Status: COMPLETED | OUTPATIENT
Start: 2023-01-10 | End: 2023-01-10

## 2023-01-10 RX ORDER — MEPERIDINE HYDROCHLORIDE 25 MG/ML
12.5 INJECTION INTRAMUSCULAR; INTRAVENOUS; SUBCUTANEOUS
Status: DISCONTINUED | OUTPATIENT
Start: 2023-01-10 | End: 2023-01-10 | Stop reason: HOSPADM

## 2023-01-10 RX ORDER — TRANEXAMIC ACID 10 MG/ML
1000 INJECTION, SOLUTION INTRAVENOUS ONCE
Status: COMPLETED | OUTPATIENT
Start: 2023-01-10 | End: 2023-01-10

## 2023-01-10 RX ORDER — ONDANSETRON 2 MG/ML
4 INJECTION INTRAMUSCULAR; INTRAVENOUS EVERY 6 HOURS PRN
Status: DISCONTINUED | OUTPATIENT
Start: 2023-01-10 | End: 2023-01-11 | Stop reason: HOSPADM

## 2023-01-10 RX ORDER — CELECOXIB 100 MG/1
200 CAPSULE ORAL ONCE
Status: COMPLETED | OUTPATIENT
Start: 2023-01-10 | End: 2023-01-10

## 2023-01-10 RX ORDER — SODIUM CHLORIDE, SODIUM LACTATE, POTASSIUM CHLORIDE, CALCIUM CHLORIDE 600; 310; 30; 20 MG/100ML; MG/100ML; MG/100ML; MG/100ML
80 INJECTION, SOLUTION INTRAVENOUS CONTINUOUS
Status: DISCONTINUED | OUTPATIENT
Start: 2023-01-10 | End: 2023-01-11 | Stop reason: HOSPADM

## 2023-01-10 RX ORDER — OXYCODONE HYDROCHLORIDE 5 MG/1
5 TABLET ORAL
Status: DISCONTINUED | OUTPATIENT
Start: 2023-01-10 | End: 2023-01-10 | Stop reason: HOSPADM

## 2023-01-10 RX ORDER — HYDROCODONE BITARTRATE AND ACETAMINOPHEN 7.5; 325 MG/1; MG/1
2 TABLET ORAL EVERY 4 HOURS PRN
Status: DISCONTINUED | OUTPATIENT
Start: 2023-01-10 | End: 2023-01-11 | Stop reason: HOSPADM

## 2023-01-10 RX ORDER — CEFAZOLIN SODIUM IN 0.9 % NACL 3 G/100 ML
3 INTRAVENOUS SOLUTION, PIGGYBACK (ML) INTRAVENOUS ONCE
Status: DISCONTINUED | OUTPATIENT
Start: 2023-01-10 | End: 2023-01-10 | Stop reason: ALTCHOICE

## 2023-01-10 RX ORDER — ONDANSETRON 4 MG/1
4 TABLET, FILM COATED ORAL EVERY 6 HOURS PRN
Status: DISCONTINUED | OUTPATIENT
Start: 2023-01-10 | End: 2023-01-11 | Stop reason: HOSPADM

## 2023-01-10 RX ORDER — PANTOPRAZOLE SODIUM 40 MG/1
40 TABLET, DELAYED RELEASE ORAL
Status: DISCONTINUED | OUTPATIENT
Start: 2023-01-11 | End: 2023-01-11 | Stop reason: HOSPADM

## 2023-01-10 RX ORDER — ROCURONIUM BROMIDE 10 MG/ML
INJECTION, SOLUTION INTRAVENOUS AS NEEDED
Status: DISCONTINUED | OUTPATIENT
Start: 2023-01-10 | End: 2023-01-10 | Stop reason: SURG

## 2023-01-10 RX ORDER — LIDOCAINE HYDROCHLORIDE 20 MG/ML
INJECTION, SOLUTION EPIDURAL; INFILTRATION; INTRACAUDAL; PERINEURAL AS NEEDED
Status: DISCONTINUED | OUTPATIENT
Start: 2023-01-10 | End: 2023-01-10 | Stop reason: SURG

## 2023-01-10 RX ORDER — GABAPENTIN 300 MG/1
300 CAPSULE ORAL ONCE
Status: COMPLETED | OUTPATIENT
Start: 2023-01-10 | End: 2023-01-10

## 2023-01-10 RX ORDER — PROPOFOL 10 MG/ML
VIAL (ML) INTRAVENOUS AS NEEDED
Status: DISCONTINUED | OUTPATIENT
Start: 2023-01-10 | End: 2023-01-10 | Stop reason: SURG

## 2023-01-10 RX ORDER — PROMETHAZINE HYDROCHLORIDE 12.5 MG/1
25 TABLET ORAL ONCE AS NEEDED
Status: DISCONTINUED | OUTPATIENT
Start: 2023-01-10 | End: 2023-01-10 | Stop reason: HOSPADM

## 2023-01-10 RX ORDER — HYDROCODONE BITARTRATE AND ACETAMINOPHEN 7.5; 325 MG/1; MG/1
1 TABLET ORAL EVERY 4 HOURS PRN
Status: DISCONTINUED | OUTPATIENT
Start: 2023-01-10 | End: 2023-01-11 | Stop reason: HOSPADM

## 2023-01-10 RX ORDER — TRANEXAMIC ACID 100 MG/ML
INJECTION, SOLUTION INTRAVENOUS AS NEEDED
Status: DISCONTINUED | OUTPATIENT
Start: 2023-01-10 | End: 2023-01-10 | Stop reason: SURG

## 2023-01-10 RX ORDER — ONDANSETRON 2 MG/ML
INJECTION INTRAMUSCULAR; INTRAVENOUS AS NEEDED
Status: DISCONTINUED | OUTPATIENT
Start: 2023-01-10 | End: 2023-01-10 | Stop reason: SURG

## 2023-01-10 RX ORDER — SODIUM CHLORIDE, SODIUM LACTATE, POTASSIUM CHLORIDE, CALCIUM CHLORIDE 600; 310; 30; 20 MG/100ML; MG/100ML; MG/100ML; MG/100ML
9 INJECTION, SOLUTION INTRAVENOUS CONTINUOUS PRN
Status: DISCONTINUED | OUTPATIENT
Start: 2023-01-10 | End: 2023-01-10 | Stop reason: HOSPADM

## 2023-01-10 RX ADMIN — SODIUM CHLORIDE, POTASSIUM CHLORIDE, SODIUM LACTATE AND CALCIUM CHLORIDE 80 ML/HR: 600; 310; 30; 20 INJECTION, SOLUTION INTRAVENOUS at 17:48

## 2023-01-10 RX ADMIN — DEXAMETHASONE SODIUM PHOSPHATE 4 MG: 4 INJECTION, SOLUTION INTRA-ARTICULAR; INTRALESIONAL; INTRAMUSCULAR; INTRAVENOUS; SOFT TISSUE at 13:14

## 2023-01-10 RX ADMIN — KETOROLAC TROMETHAMINE 15 MG: 15 INJECTION, SOLUTION INTRAMUSCULAR; INTRAVENOUS at 17:48

## 2023-01-10 RX ADMIN — SODIUM CHLORIDE, POTASSIUM CHLORIDE, SODIUM LACTATE AND CALCIUM CHLORIDE 9 ML/HR: 600; 310; 30; 20 INJECTION, SOLUTION INTRAVENOUS at 11:26

## 2023-01-10 RX ADMIN — BUPIVACAINE HYDROCHLORIDE AND EPINEPHRINE BITARTRATE 30 ML: 5; .005 INJECTION, SOLUTION EPIDURAL; INTRACAUDAL; PERINEURAL at 12:51

## 2023-01-10 RX ADMIN — ACETAMINOPHEN 1000 MG: 500 TABLET ORAL at 11:26

## 2023-01-10 RX ADMIN — ONDANSETRON 4 MG: 2 INJECTION INTRAMUSCULAR; INTRAVENOUS at 13:14

## 2023-01-10 RX ADMIN — OXYCODONE HYDROCHLORIDE 5 MG: 5 TABLET ORAL at 15:35

## 2023-01-10 RX ADMIN — CEFAZOLIN SODIUM 2 G: 2 INJECTION, SOLUTION INTRAVENOUS at 20:08

## 2023-01-10 RX ADMIN — ROCURONIUM BROMIDE 50 MG: 10 INJECTION, SOLUTION INTRAVENOUS at 13:07

## 2023-01-10 RX ADMIN — ACETAMINOPHEN 1000 MG: 500 TABLET ORAL at 17:48

## 2023-01-10 RX ADMIN — SODIUM CHLORIDE, POTASSIUM CHLORIDE, SODIUM LACTATE AND CALCIUM CHLORIDE: 600; 310; 30; 20 INJECTION, SOLUTION INTRAVENOUS at 14:42

## 2023-01-10 RX ADMIN — KETOROLAC TROMETHAMINE 15 MG: 15 INJECTION, SOLUTION INTRAMUSCULAR; INTRAVENOUS at 23:22

## 2023-01-10 RX ADMIN — MEPERIDINE HYDROCHLORIDE 12.5 MG: 25 INJECTION, SOLUTION INTRAMUSCULAR; INTRAVENOUS; SUBCUTANEOUS at 15:01

## 2023-01-10 RX ADMIN — GABAPENTIN 300 MG: 300 CAPSULE ORAL at 11:26

## 2023-01-10 RX ADMIN — CELECOXIB 200 MG: 100 CAPSULE ORAL at 11:26

## 2023-01-10 RX ADMIN — HYDROMORPHONE HYDROCHLORIDE 1 MG: 1 INJECTION, SOLUTION INTRAMUSCULAR; INTRAVENOUS; SUBCUTANEOUS at 14:01

## 2023-01-10 RX ADMIN — Medication 100 MCG: at 13:18

## 2023-01-10 RX ADMIN — HYDROMORPHONE HYDROCHLORIDE 1 MG: 1 INJECTION, SOLUTION INTRAMUSCULAR; INTRAVENOUS; SUBCUTANEOUS at 14:18

## 2023-01-10 RX ADMIN — TRANEXAMIC ACID 1000 MG: 100 INJECTION, SOLUTION INTRAVENOUS at 14:31

## 2023-01-10 RX ADMIN — GLYCOPYRROLATE 0.2 MG: 0.2 INJECTION INTRAMUSCULAR; INTRAVENOUS at 12:24

## 2023-01-10 RX ADMIN — MIDAZOLAM HYDROCHLORIDE 2 MG: 2 INJECTION, SOLUTION INTRAMUSCULAR; INTRAVENOUS at 12:24

## 2023-01-10 RX ADMIN — FENTANYL CITRATE 100 MCG: 50 INJECTION, SOLUTION INTRAMUSCULAR; INTRAVENOUS at 13:06

## 2023-01-10 RX ADMIN — CEFAZOLIN SODIUM 2 G: 2 INJECTION, SOLUTION INTRAVENOUS at 13:06

## 2023-01-10 RX ADMIN — SUGAMMADEX 200 MG: 100 INJECTION, SOLUTION INTRAVENOUS at 14:47

## 2023-01-10 RX ADMIN — TRANEXAMIC ACID 1000 MG: 10 INJECTION, SOLUTION INTRAVENOUS at 12:24

## 2023-01-10 RX ADMIN — LIDOCAINE HYDROCHLORIDE 100 MG: 20 INJECTION, SOLUTION EPIDURAL; INFILTRATION; INTRACAUDAL; PERINEURAL at 13:07

## 2023-01-10 RX ADMIN — ACETAMINOPHEN 1000 MG: 500 TABLET ORAL at 23:21

## 2023-01-10 RX ADMIN — PROPOFOL 150 MG: 10 INJECTION, EMULSION INTRAVENOUS at 13:07

## 2023-01-10 NOTE — PLAN OF CARE
Goal Outcome Evaluation:   Pt stable throughout shift, VSS. Dressing clean, dry and intact. Voiding without difficulty. No complaint of pain since arriving to the floor. Anticipates discharge home tomorrow. Ambulated in room and moser with nursing staff.

## 2023-01-10 NOTE — CONSULTS
[facility]   Hospitalist Consult Note  Date: 1/10/2023   Patient Name: Ramon Holman  : 1955  MRN: [id number]  Primary Care Physician:  Jason Larkin MD  Referring Physician: Vladimir Vanegas MD  Date of admission: 1/10/2023    Subjective   Subjective     Reason for Consult/ Chief Complaint: Bilateral knee osteoarthritis status post left knee total arthroplasty    HPI:  Ramon Holman is a 68 y.o. male who has a history of severe degenerative changes/osteoarthritis of the bilateral knees.  The patient has been treated conservatively for many months if not years.  Despite conservative therapy, symptoms of bilateral knee pain, stiffness, instability, weakness, and occasional swelling have progressed.  The symptoms occurred with activity but sometimes at rest.  Activities of daily living and quality of life are being affected by this.  The patient was admitted to the hospital for elective left knee total arthroplasty which was performed on 1/10/2023 by Dr. Vanegas.      Personal History     Past Medical History:  Past Medical History:   Diagnosis Date   • Arthritis    • GERD (gastroesophageal reflux disease)    • Hyperlipidemia         Past Surgical History:  Past Surgical History:   Procedure Laterality Date   • APPENDECTOMY     • COLONOSCOPY      X2   • ENDOSCOPY      X2   • KNEE ARTHROSCOPY Bilateral     2 LEFT 1 RIGHT         Family History:   Family History   Problem Relation Age of Onset   • Malig Hyperthermia Neg Hx          Social History:   Social History     Tobacco Use   • Smoking status: Never   • Smokeless tobacco: Never   Vaping Use   • Vaping Use: Never used   Substance Use Topics   • Alcohol use: Not Currently   • Drug use: Never         Home Medications:  Calcium Citrate-Vitamin D3, Glucosamine 1500 Complex, Omega 3, Vitamin C, aspirin, famotidine, fenofibrate micronized, lovastatin, melatonin, multivitamin with minerals, omeprazole, and vitamin D3    Allergies:  No Known  Allergies    Review of Systems   All systems were reviewed and negative except for: Noted above or in HPI    Objective    Objective     Vitals:   Temp:  [96.6 °F (35.9 °C)-98.3 °F (36.8 °C)] 97.9 °F (36.6 °C)  Heart Rate:  [58-82] 59  Resp:  [14-18] 17  BP: (103-182)/(60-87) 135/79  Flow (L/min):  [3] 3  FiO2 (%):  [0.5 %] 0.5 %    Physical Exam:   Constitutional: Awake, alert, no acute distress   Eyes: Pupils equal, sclerae anicteric, no conjunctival injection   HENT: NCAT, mucous membranes moist   Neck: Supple, no thyromegaly, no lymphadenopathy, trachea midline   Respiratory: Clear to auscultation bilaterally, nonlabored respirations    Cardiovascular: RRR, no murmurs, rubs, or gallops, palpable pedal pulses bilaterally   Gastrointestinal: Positive bowel sounds, soft, nontender, nondistended   Musculoskeletal: Left knee postoperative braces   Psychiatric: Appropriate affect, cooperative   Neurologic: Oriented x 3, strength symmetric in all extremities, Cranial Nerves grossly intact to confrontation, speech clear   Skin: No rashes         Assessment & Plan   Assessment / Plan     Assessment/Plan:  • Bilateral knee osteoarthritis status post left total knee arthroplasty with Trena robot  • GERD  • Hyperlipidemia      The patient is admitted for postoperative care  Pain control with IV and oral narcotic pain medication  Physical therapy and occupational therapy consult   consult for disposition assistance  Initiate bowel regimen to avoid constipation with pain medication use  Continue outpatient medications for chronic stable illnesses  Risk of primary complaint: patient is at significant risk of morbidity if primary complaint is not treated especially considering the patient's comorbidities.    DVT prophylaxis:  Medical and mechanical DVT prophylaxis orders are present.    CODE STATUS:    Code Status (Patient has no pulse and is not breathing): CPR (Attempt to Resuscitate)  Medical Interventions  (Patient has pulse or is breathing): Full Support      Electronically signed by Jason Low DO, 01/10/23, 5:12 PM EST.

## 2023-01-10 NOTE — ANESTHESIA POSTPROCEDURE EVALUATION
Patient: Ramon Holman    Procedure Summary     Date: 01/10/23 Room / Location: [facility] OR 03 / [facility] MAIN OR    Anesthesia Start: 1306 Anesthesia Stop: 1458    Procedure: LEFT TOTAL KNEE ARTHROPLASTY WITH WILIAN ROBOT (Left: Knee) Diagnosis:       Bilateral primary osteoarthritis of knee      (Bilateral primary osteoarthritis of knee [M17.0])    Surgeons: Vladimir Vanegas MD Provider: Reyes, Mirabelle, DO    Anesthesia Type: general with block, ERAS Protocol ASA Status: 1          Anesthesia Type: general with block, ERAS Protocol    Vitals  Vitals Value Taken Time   /74 01/10/23 1511   Temp     Pulse 63 01/10/23 1515   Resp     SpO2 96 % 01/10/23 1515   Vitals shown include unvalidated device data.        Post Anesthesia Care and Evaluation    Patient location during evaluation: bedside  Patient participation: complete - patient participated  Level of consciousness: awake and alert  Pain management: adequate    Airway patency: patent  Anesthetic complications: No anesthetic complications  PONV Status: none  Cardiovascular status: acceptable  Respiratory status: acceptable  Hydration status: acceptable    Comments: An Anesthesiologist personally participated in the most demanding procedures (including induction and emergence if applicable) in the anesthesia plan, monitored the course of anesthesia administration at frequent intervals and remained physically present and available for immediate diagnosis and treatment of emergencies.

## 2023-01-10 NOTE — OP NOTE
TOTAL KNEE ARTHROPLASTY WITH WILIAN ROBOT  Procedure Report    Patient Name:  Ramon Holman  YOB: 1955    Date of Surgery:  1/10/2023       Pre-op Diagnosis:   Bilateral primary osteoarthritis of knee [M17.0]   Left knee primary arthritis       Post-Op Diagnosis Codes:     * Bilateral primary osteoarthritis of knee [M17.0]   Left knee primary arthritis    Procedure/CPT® Codes:      Procedure(s):  LEFT TOTAL KNEE ARTHROPLASTY WITH WILIAN ROBOT    Surgical Approach: Knee Medial Parapatellar        Staff:  Surgeon(s):  Vladimir Vanegas MD    Assistant: Cat Sharpe RN; Jez Roman    Anesthesia: General with Block    Estimated Blood Loss: 50ml    Implants:    Implant Name Type Inv. Item Serial No.  Lot No. LRB No. Used Action   CMT BONE PALACOS R HI/VISC 1X40 - [id number] Implant CMT BONE PALACOS R HI/VISC 1X40  [facility] [id number] Left 2 Implanted   STEM TIB/KN PERSONA CMT 5D SZF LT - [id number] Implant STEM TIB/KN PERSONA CMT 5D SZF LT  [employer] [id number] Left 1 Implanted   COMP FEM/KN PERSONA CR CMT COCR STD SZ11 LT - [id number] Implant COMP FEM/KN PERSONA CR CMT COCR STD SZ11 LT  [employer] [id number] Left 1 Implanted   ART/SRF KN PERSONA/VE PS EF 8TO11 10MM LT - [id number] Implant ART/SRF KN PERSONA/VE PS EF 8TO11 10MM LT  [employer] [id number] Left 1 Implanted   PAT KN PERSONA ALLPOLY CMT 8.5X32MM - [id number] Implant PAT KN PERSONA ALLPOLY CMT 8.5X32MM  [employer] [id number] Left 1 Implanted   CAP TOTL KN CMT PRIMARY W/WILIAN - [id number] Implant CAP TOTL KN CMT PRIMARY W/WILIAN  [employer]  Left 1 Implanted       Specimen:          None    Findings: See description    Complications: None    Description of Procedure: Patient was taken to the operating room placed supine operating table after abductor canal blocks and in preoperative holding.  After general tracheal anesthesia was established the right knee and lower extremity were prepped and draped in standard surgical  fashion using alcohol ChloraPrep.  The Trena robot was also draped sterilely and calibrated.  Incision was made 2 fingerbreadth superior superior pole of the patella down to the medial aspect of tibial tubercle the knife and full-thickness skin flaps were raised laterally and medially.  A medial parapatellar arthrotomy was then undertaken and the knee was brought into flexion.  Retractors were placed to protect protect the collateral ligaments.  Soft tissue releases and osteophytes removed as deemed necessary.  Then the tracking device was mounted on the distal femur in a standard fashion as well as through separate stab incisions in the distal tibia 5 fingerbreadths inferior to the tibial tubercle.  Then all the femoral points were mapped out using the Trena software the tibial points were mapped out as well.  While the plan for resection was being completed the patella was everted calipered and cut to the appropriate thickness.  The correct size patella was chosen in the locals for the patella were reamed and a trial patella was placed and the knee was brought back into flexion.  The distal femoral cutting block was then brought in using robotic assisted arm and the distal femoral cut was made it was checked and verified and accepted.  Then the external rotation arm of the robot was used to pin the distal femur and the correct external rotation decided by the intraoperative plan using the previously mapped points.  Then the tibia was cut after removing the robotic arm and to the appropriate position to cut the tibia the cutting block was pinned and the proximal tibia cut was made excess bone from the cut was removed and the 4-in-1 cutting block was then used in the distal femur.  The tibial cut was verified and accepted femoral and tibial trials were then placed and the knee was taken through range of motion verifying flexion extension gaps and extension and flexion range of motion to be acceptable by using the  software in a standard fashion.  Locals for the femur were then drilled the trials were removed the bone ends were copiously irrigated with bacitracin Simpulse irrigation.  The tibia was cemented in place according to marked external rotation from the trials the femur was cemented in position second and then the real polychosen was placed.  Excess cement removed from the implant edges the knee was held in extension until the cement dried and the patella was cemented into place and held with a patellar clamp.  After the cement hardened excess management from the implant edges Irrisept was used and wound was copiously irrigated the knee was taken through range of motion and checked 1 last time the patella tracked in the center of the trochlear groove the femur using no thumbs technique.  Then the arthrotomy was closed in 45 degrees of flexion with Ethibond the deep fat was closed 0 Vicryl subcu was closed with 2-0 Vicryl and the skin was closed with staples incision was washed and dried and sterile dressings were applied the patient tolerated the procedure well and was taken to recovery room.       Vladimir Vanegas MD     Date: 1/10/2023  Time: 17:01 EST

## 2023-01-10 NOTE — H&P
"h and p      Chief Complaint  Initial Evaluation of the Left Knee and Initial Evaluation of the Right Knee        Subjective          Ramon Holman presents to [facility] for initial evaluation of bilateral knees. The patient has had pain for several years.  The patient enjoys working in his garage and having a hard time with static standing activities.  The patient has more trouble with the left knee as the X-ray report shows severe arthritis on the medial side of the knee.  The patient notes ROM in bilateral knees also.  No known injury.      No Known Allergies      Social History           Socioeconomic History   • Marital status:    Tobacco Use   • Smoking status: Never         Review of Systems            Objective      Vital Signs:   Ht 182.9 cm (72\")   Wt 88 kg (194 lb)   BMI 26.31 kg/m²        Physical Exam  Constitutional:       Appearance: Normal appearance. Patient is well-developed and normal weight.   HENT:      Head: Normocephalic.      Right Ear: Hearing and external ear normal.      Left Ear: Hearing and external ear normal.      Nose: Nose normal.   Eyes:      Conjunctiva/sclera: Conjunctivae normal.   Cardiovascular:      Rate and Rhythm: Normal rate.   Pulmonary:      Effort: Pulmonary effort is normal.      Breath sounds: No wheezing or rales.   Abdominal:      Palpations: Abdomen is soft.      Tenderness: There is no abdominal tenderness.   Musculoskeletal:      Cervical back: Normal range of motion.   Skin:     Findings: No rash.   Neurological:      Mental Status: Patient  is alert and oriented to person, place, and time.   Psychiatric:         Mood and Affect: Mood and affect normal.         Judgment: Judgment normal.         Ortho Exam       LEFT KNEE -8 - 110. Dorsal pedal pulse 2+. Posterior tibialis pulse is 2+. Good strength to hamstrings, quadriceps, dorsiflexors, and plantar flexors. Good tone of hip flexors, hip extensors, and hip abductors. " Calf supple.  Sensation intact. Neurovascular Intact. Slight swelling.      RIGHT KNEE -2 - 110 Dorsal pedal pulse 2+. Posterior tibialis pulse is 2+. Good strength to hamstrings, quadriceps, dorsiflexors, and plantar flexors. Calf supple. Sensation intact. Neurovascular Intact. Slight swelling. Sensation intact. Neurovascular Intact.      Procedures               Imaging Results (Most Recent)      None                   Result Review    :            XR Knee 3 View Left     Result Date: 11/3/2022  Narrative: PROCEDURE:       XR KNEE 3 VW LEFT  COMPARISON:        None  INDICATIONS:           SECONDARY OSTEOARTHRITIS. CHRONIC BILATERAL KNEE PAIN.  FINDINGS:      Marked degenerative change consistent with osteoarthritis is seen in the medial and patellofemoral compartments.  Moderate degenerative changes seen in the lateral compartment.  No fractures are visualized.  No lytic or sclerotic bone lesions are seen.  A small joint effusion is seen.       Impression:      Left knee series demonstrating moderate to marked tricompartmental degenerative change consistent with osteoarthritis.  Small joint effusion.      SUPRIYA ROUSSEAU MD       Electronically Signed and Approved By: SUPRIYA ROUSSEAU MD on 11/03/2022 at 12:17              XR Knee 3 View Right     Result Date: 11/3/2022  Narrative: PROCEDURE:       XR KNEE 3 VW RIGHT  COMPARISON:      None  INDICATIONS:           SECONDARY OSTEOARTHRITIS. CHRONIC BILATERAL KNEE PAIN.  FINDINGS:      Moderate tricompartmental degenerative changes consistent with osteoarthritis.  No fractures are visualized.  No lytic or sclerotic bone lesions are seen.  A small joint effusion is seen.       Impression:      Right knee series demonstrating moderate tricompartmental degenerative change consistent with osteoarthritis.  Small joint effusion.      SUPRIYA ROUSSEAU MD       Electronically Signed and Approved By: SUPRIYA ROUSSEAU MD on 11/03/2022 at 12:18                          Assessment       Assessment and Plan      Diagnoses and all orders for this visit:     1. Bilateral primary osteoarthritis of knee (Primary)           Discussed the treatment plan with the patient. Discussed the treatment options with the patient, operative vs non-operative. Discussed conservative measures as exercises, anti-inflammatory and injection. Discussed bilateral knee surgery but then decided to do one at a time.      Educated on risk of smoking. Discussed options for smoking cessation.  Discussed surgery., Risks/benefits discussed with patient including, but not limited to: infection, bleeding, neurovascular damage, malunion, nonunion, aesthetic deformity, need for further surgery, and death. and Surgery pamphlet given.     Follow Up      Postoperatively      Vladimir Vanegas MD  01/10/23

## 2023-01-10 NOTE — ANESTHESIA PROCEDURE NOTES
Peripheral Block    Pre-sedation assessment completed: 1/10/2023 12:48 PM    Patient reassessed immediately prior to procedure    Patient location during procedure: pre-op  Start time: 1/10/2023 12:48 PM  Stop time: 1/10/2023 12:51 PM  Reason for block: at surgeon's request and post-op pain management  Performed by  Anesthesiologist: Reyes, Mirabelle, DO  Assisted by: Deidre Bennett RN  Preanesthetic Checklist  Completed: patient identified, IV checked, site marked, risks and benefits discussed, surgical consent, monitors and equipment checked, pre-op evaluation and timeout performed  Prep:  Pt Position: supine  Sterile barriers:alcohol skin prep, partial drape, cap, washed/disinfected hands, mask and gloves  Prep: ChloraPrep  Patient monitoring: blood pressure monitoring, continuous pulse oximetry and EKG  Procedure    Sedation: yes  Performed under: local infiltration  Guidance:ultrasound guided and nerve stimulator    ULTRASOUND INTERPRETATION.  Using ultrasound guidance a 20 G gauge needle was placed in close proximity to the femoral nerve, at which point, under ultrasound guidance anesthetic was injected in the area of the nerve and spread of the anesthesia was seen on ultrasound in close proximity thereto.  There were no abnormalities seen on ultrasound; a digital image was taken; and the patient tolerated the procedure with no complications. Images:still images obtained, printed/placed on chart    Laterality:left  Block Type:adductor canal block  Injection Technique:single-shot  Needle Type:echogenic  Needle Gauge:20 G (4in)  Resistance on Injection: none    Medications Used: bupivacaine-EPINEPHrine PF (MARCAINE w/EPI) 0.5% -[id number] injection - Injection, Adductor Canal   30 mL - 1/10/2023 12:51:00 PM      Post Assessment  Injection Assessment: negative aspiration for heme, no paresthesia on injection and incremental injection  Patient Tolerance:comfortable throughout block  Complications:no  Additional  Notes  The block or continuous infusion is requested by the referring physician for management of postoperative pain, or pain related to a procedure. Ultrasound guidance (deemed medically necessary). Painless injection, pt was awake and conversant during the procedure without complications. Needle and surrounding structures visualized throughout procedure. No adverse reactions or complications seen during this period. Post-procedure image showed no signs of complication, and anatomy was consistent with an uncomplicated nerve blockade.           Thomas Dominique

## 2023-01-10 NOTE — ANESTHESIA PREPROCEDURE EVALUATION
Anesthesia Evaluation     Patient summary reviewed and Nursing notes reviewed                Airway   Mallampati: I  TM distance: >3 FB  Neck ROM: full  No difficulty expected  Dental      Pulmonary - negative pulmonary ROS and normal exam    breath sounds clear to auscultation  Cardiovascular - normal exam    Rhythm: regular  Rate: normal    (+) hyperlipidemia,       Neuro/Psych- negative ROS  GI/Hepatic/Renal/Endo    (+)  GERD well controlled,      Musculoskeletal     Abdominal    Substance History - negative use     OB/GYN negative ob/gyn ROS         Other   arthritis,                      Anesthesia Plan    ASA 1     general with block and ERAS Protocol     intravenous induction     Anesthetic plan, risks, benefits, and alternatives have been provided, discussed and informed consent has been obtained with: patient.        CODE STATUS:

## 2023-01-11 VITALS
HEIGHT: 71 IN | BODY MASS INDEX: 26.7 KG/M2 | TEMPERATURE: 98.1 F | DIASTOLIC BLOOD PRESSURE: 69 MMHG | WEIGHT: 190.7 LBS | SYSTOLIC BLOOD PRESSURE: 140 MMHG | RESPIRATION RATE: 18 BRPM | OXYGEN SATURATION: 98 % | HEART RATE: 60 BPM

## 2023-01-11 LAB
DEPRECATED RDW RBC AUTO: 43.8 FL (ref 37–54)
ERYTHROCYTE [DISTWIDTH] IN BLOOD BY AUTOMATED COUNT: 13.4 % (ref 12.3–15.4)
HCT VFR BLD AUTO: 32.9 % (ref 37.5–51)
HGB BLD-MCNC: 11.1 G/DL (ref 13–17.7)
MCH RBC QN AUTO: 29.7 PG (ref 26.6–33)
MCHC RBC AUTO-ENTMCNC: 33.7 G/DL (ref 31.5–35.7)
MCV RBC AUTO: 88 FL (ref 79–97)
PLATELET # BLD AUTO: 201 10*3/MM3 (ref 140–450)
PMV BLD AUTO: 9.3 FL (ref 6–12)
RBC # BLD AUTO: 3.74 10*6/MM3 (ref 4.14–5.8)
WBC NRBC COR # BLD: 12.4 10*3/MM3 (ref 3.4–10.8)

## 2023-01-11 PROCEDURE — 25010000002 KETOROLAC TROMETHAMINE PER 15 MG: Performed by: ORTHOPAEDIC SURGERY

## 2023-01-11 PROCEDURE — 63710000001 APIXABAN 2.5 MG TABLET: Performed by: ORTHOPAEDIC SURGERY

## 2023-01-11 PROCEDURE — 97535 SELF CARE MNGMENT TRAINING: CPT

## 2023-01-11 PROCEDURE — 25010000002 CEFAZOLIN IN DEXTROSE 2-4 GM/100ML-% SOLUTION: Performed by: ORTHOPAEDIC SURGERY

## 2023-01-11 PROCEDURE — 97530 THERAPEUTIC ACTIVITIES: CPT

## 2023-01-11 PROCEDURE — 97165 OT EVAL LOW COMPLEX 30 MIN: CPT

## 2023-01-11 PROCEDURE — 85027 COMPLETE CBC AUTOMATED: CPT | Performed by: ORTHOPAEDIC SURGERY

## 2023-01-11 PROCEDURE — 63710000001 ACETAMINOPHEN 500 MG TABLET: Performed by: ORTHOPAEDIC SURGERY

## 2023-01-11 PROCEDURE — 97161 PT EVAL LOW COMPLEX 20 MIN: CPT

## 2023-01-11 PROCEDURE — A9270 NON-COVERED ITEM OR SERVICE: HCPCS | Performed by: FAMILY MEDICINE

## 2023-01-11 PROCEDURE — A9270 NON-COVERED ITEM OR SERVICE: HCPCS | Performed by: ORTHOPAEDIC SURGERY

## 2023-01-11 PROCEDURE — 63710000001 PANTOPRAZOLE 40 MG TABLET DELAYED-RELEASE: Performed by: FAMILY MEDICINE

## 2023-01-11 PROCEDURE — 97110 THERAPEUTIC EXERCISES: CPT

## 2023-01-11 RX ORDER — HYDROCODONE BITARTRATE AND ACETAMINOPHEN 7.5; 325 MG/1; MG/1
1 TABLET ORAL EVERY 4 HOURS PRN
Qty: 45 TABLET | Refills: 0 | Status: SHIPPED | OUTPATIENT
Start: 2023-01-11 | End: 2023-01-13 | Stop reason: ALTCHOICE

## 2023-01-11 RX ORDER — ASPIRIN 325 MG
325 TABLET, DELAYED RELEASE (ENTERIC COATED) ORAL DAILY
Qty: 21 TABLET | Refills: 0 | Status: SHIPPED | OUTPATIENT
Start: 2023-01-11

## 2023-01-11 RX ADMIN — KETOROLAC TROMETHAMINE 15 MG: 15 INJECTION, SOLUTION INTRAMUSCULAR; INTRAVENOUS at 04:57

## 2023-01-11 RX ADMIN — KETOROLAC TROMETHAMINE 15 MG: 15 INJECTION, SOLUTION INTRAMUSCULAR; INTRAVENOUS at 11:21

## 2023-01-11 RX ADMIN — ACETAMINOPHEN 1000 MG: 500 TABLET ORAL at 05:01

## 2023-01-11 RX ADMIN — APIXABAN 2.5 MG: 2.5 TABLET, FILM COATED ORAL at 08:14

## 2023-01-11 RX ADMIN — CEFAZOLIN SODIUM 2 G: 2 INJECTION, SOLUTION INTRAVENOUS at 04:56

## 2023-01-11 RX ADMIN — PANTOPRAZOLE SODIUM 40 MG: 40 TABLET, DELAYED RELEASE ORAL at 05:11

## 2023-01-11 NOTE — PLAN OF CARE
Goal Outcome Evaluation:  Plan of Care Reviewed With: patient           Outcome Evaluation: VSS NO DISTRESS NOTED PATIENT HAS AMBULATED IN THE PANDEY TO NIGHT VOIDING WITHOUT DIFFICULITY EXERCISES  REINFORCED WITH PATIENT

## 2023-01-11 NOTE — THERAPY EVALUATION
Patient Name: Ramon Holman  : 1955    MRN: [id number]                              Today's Date: 2023       Admit Date: 1/10/2023    Visit Dx:     ICD-10-CM ICD-9-CM   1. Osteoarthrosis, localized, primary, knee, left  M17.12 715.16   2. Bilateral primary osteoarthritis of knee  M17.0 715.16   3. Decreased activities of daily living (ADL)  Z78.9 V49.89     Patient Active Problem List   Diagnosis   • Osteoarthrosis, localized, primary, knee, left   • Bilateral primary osteoarthritis of knee     Past Medical History:   Diagnosis Date   • Arthritis    • GERD (gastroesophageal reflux disease)    • Hyperlipidemia      Past Surgical History:   Procedure Laterality Date   • APPENDECTOMY     • COLONOSCOPY      X2   • ENDOSCOPY      X2   • KNEE ARTHROSCOPY Bilateral     2 LEFT 1 RIGHT   • TOTAL KNEE ARTHROPLASTY Left 1/10/2023    Procedure: LEFT TOTAL KNEE ARTHROPLASTY WITH WILIAN ROBOT;  Surgeon: Vladimir Vanegas MD;  Location: [facility] MAIN OR;  Service: Robotics - Ortho;  Laterality: Left;      General Information     Row Name 23 0906 23 0858       OT Time and Intention    Document Type therapy note (daily note)  -PG evaluation  -PG    Mode of Treatment individual therapy;occupational therapy  -PG individual therapy;occupational therapy  -PG    Row Name 23 0858          General Information    Patient Profile Reviewed yes  -PG     Prior Level of Function independent:;transfer;ADL's  -PG     Existing Precautions/Restrictions fall  -PG     Barriers to Rehab none identified  -PG     Row Name 23 0858          Occupational Profile    Reason for Services/Referral (Occupational Profile) Patient is a pleasant 68-year-old male admitted for a left total knee replacement.  No previous OT services identified.  Patient is being evaluated by Occupational Therapy due to recent decline in ADL function  -PG     Row Name 23 0858          Living Environment    People in Home spouse  -PG     Row Name  01/11/23 0858          Cognition    Orientation Status (Cognition) oriented x 3  -PG     Row Name 01/11/23 0858          Safety Issues, Functional Mobility    Impairments Affecting Function (Mobility) balance;pain;strength  -PG           User Key  (r) = Recorded By, (t) = Taken By, (c) = Cosigned By    Initials Name Provider Type    PG Homero Garcia, OT Occupational Therapist                 Mobility/ADL's     Row Name 01/11/23 0907 01/11/23 0859       Transfers    Transfers bed-chair transfer  -PG bed-chair transfer  -PG    Row Name 01/11/23 0907 01/11/23 0859       Bed-Chair Transfer    Bed-Chair [place] (Transfers) standby assist;verbal cues  -PG standby assist;verbal cues  -PG    Assistive Device (Bed-Chair Transfers) walker, front-wheeled  -PG walker, front-wheeled  -PG    Row Name 01/11/23 0859          Activities of Daily Living    BADL Assessment/Intervention upper body dressing;lower body dressing;bathing;grooming;toileting  -PG     Row Name 01/11/23 0907 01/11/23 0859       Upper Body Dressing Assessment/Training    [place] Level (Upper Body Dressing) upper body dressing skills;set up  -PG upper body dressing skills;set up  -PG    Row Name 01/11/23 0907 01/11/23 0859       Lower Body Dressing Assessment/Training    [place] Level (Lower Body Dressing) lower body dressing skills;standby assist  -PG lower body dressing skills;standby assist  -PG    Position (Lower Body Dressing) supported standing  -PG supported standing  -PG    Row Name 01/11/23 0907 01/11/23 0859       Bathing Assessment/Intervention    [place] Level (Bathing) bathing skills;standby assist  -PG bathing skills;standby assist  -PG    Position (Bathing) supported standing  -PG supported standing  -PG    Row Name 01/11/23 0907 01/11/23 0859       Grooming Assessment/Training    [place] Level (Grooming) grooming skills;standby assist  -PG grooming skills;standby assist  -PG    Position (Grooming) supported standing  -PG  supported standing  -PG    Row Name 01/11/23 0907 01/11/23 0859       Toileting Assessment/Training    [place] Level (Toileting) toileting skills;standby assist  -PG toileting skills;standby assist  -PG          User Key  (r) = Recorded By, (t) = Taken By, (c) = Cosigned By    Initials Name Provider Type    PG Homero Garcia OT Occupational Therapist               Obj/Interventions     Row Name 01/11/23 0901          Sensory Assessment (Somatosensory)    Sensory Assessment (Somatosensory) sensation intact  -PG     Row Name 01/11/23 0901          Vision Assessment/Intervention    Visual Impairment/Limitations WFL  -PG     Row Name 01/11/23 0901          Range of Motion Comprehensive    General Range of Motion no range of motion deficits identified  -PG     Row Name 01/11/23 0901          Strength Comprehensive (MMT)    General Manual Muscle Testing (MMT) Assessment no strength deficits identified  -PG     [facility] Name 01/11/23 0901          Motor Skills    Motor Skills coordination;functional endurance  -PG     Coordination WFL  -PG     Functional Endurance Fair plus  -PG           User Key  (r) = Recorded By, (t) = Taken By, (c) = Cosigned By    Initials Name Provider Type    PG Hmoero Garcia OT Occupational Therapist               Goals/Plan     [facility] Name 01/11/23 0904          Transfer Goal 1 (OT)    Activity/Assistive Device (Transfer Goal 1, OT) transfers, all  -PG     [place] Level/Cues Needed (Transfer Goal 1, OT) modified independence  -PG     Time Frame (Transfer Goal 1, OT) long term goal (LTG);10 days  -PG     [facility] Name 01/11/23 0904          Bathing Goal 1 (OT)    Activity/Device (Bathing Goal 1, OT) bathing skills, all  -PG     [place] Level/Cues Needed (Bathing Goal 1, OT) modified independence  -PG     Time Frame (Bathing Goal 1, OT) long term goal (LTG);10 days  -PG     [facility] Name 01/11/23 0904          Dressing Goal 1 (OT)    Activity/Device (Dressing Goal 1, OT) dressing skills, all  -PG      [place]/Cues Needed (Dressing Goal 1, OT) modified independence  -PG     Time Frame (Dressing Goal 1, OT) long term goal (LTG);10 days  -PG     Row Name 01/11/23 0904          Toileting Goal 1 (OT)    Activity/Device (Toileting Goal 1, OT) toileting skills, all  -PG     [place] Level/Cues Needed (Toileting Goal 1, OT) modified independence  -PG     Time Frame (Toileting Goal 1, OT) long term goal (LTG);10 days  -PG     Row Name 01/11/23 0904          Grooming Goal 1 (OT)    Activity/Device (Grooming Goal 1, OT) grooming skills, all  -PG     [place] (Grooming Goal 1, OT) modified independence  -PG     Time Frame (Grooming Goal 1, OT) long term goal (LTG);10 days  -PG     Row Name 01/11/23 0904          Therapy Assessment/Plan (OT)    Planned Therapy Interventions (OT) BADL retraining;transfer/mobility retraining;patient/caregiver education/training;occupation/activity based interventions  -PG           User Key  (r) = Recorded By, (t) = Taken By, (c) = Cosigned By    Initials Name Provider Type    PG Homero Garcia, DRE Occupational Therapist               Clinical Impression     Row Name 01/11/23 0903          Pain Assessment    Pretreatment Pain Rating 0/10 - no pain  -PG     Posttreatment Pain Rating 0/10 - no pain  -PG     Row Name 01/11/23 0903          Plan of Care Review    Plan of Care Reviewed With patient  -PG     Progress no change  -PG     Outcome Evaluation Patient presents with limitations affecting strength, activity tolerance, and balance impacting patient's ability to return home safely and independently.  The skills of a therapist will be required to safely and effectively implement the following treatment plan to restore maximal level of function  -PG     Row Name 01/11/23 0903          Therapy Assessment/Plan (OT)    Patient/Family Therapy Goal Statement (OT) Walk without  -PG     Rehab Potential (OT) good, to achieve stated therapy goals  -PG     Criteria for Skilled Therapeutic  Interventions Met (OT) yes;meets criteria;skilled treatment is necessary  -PG     Therapy Frequency (OT) 5 times/wk  -PG     Row Name 01/11/23 0903          Therapy Plan Review/Discharge Plan (OT)    Anticipated Discharge Disposition (OT) home with outpatient therapy services  -PG           User Key  (r) = Recorded By, (t) = Taken By, (c) = Cosigned By    Initials Name Provider Type    PG Homero Garcia, DRE Occupational Therapist               Outcome Measures     Row Name 01/11/23 0905          How much help from another is currently needed...    Putting on and taking off regular lower body clothing? 3  -PG     Bathing (including washing, rinsing, and drying) 3  -PG     Toileting (which includes using toilet bed pan or urinal) 3  -PG     Putting on and taking off regular upper body clothing 3  -PG     Taking care of personal grooming (such as brushing teeth) 3  -PG     Eating meals 4  -PG     AM-PAC 6 Clicks Score (OT) 19  -PG     Row Name 01/11/23 0735          How much help from another person do you currently need...    Turning from your back to your side while in flat bed without using bedrails? 4  -DB     Moving from lying on back to sitting on the side of a flat bed without bedrails? 3  -DB     Moving to and from a bed to a chair (including a wheelchair)? 3  -DB     Standing up from a chair using your arms (e.g., wheelchair, bedside chair)? 3  -DB     Climbing 3-5 steps with a railing? 3  -DB     To walk in hospital room? 3  -DB     AM-PAC 6 Clicks Score (PT) 19  -DB     Row Name 01/11/23 0905          Functional Assessment    Outcome Measure Options AM-PAC 6 Clicks Daily Activity (OT);Optimal Instrument  -PG     Row Name 01/11/23 0905          Optimal Instrument    Optimal Instrument Optimal - 3  -PG     Bending/Stooping 2  -PG     Standing 2  -PG     Reaching 1  -PG     From the list, choose the 3 activities you would most like to be able to do without any difficulty Bending/stooping;Standing;Reaching  -PG      Total Score Optimal - 3 5  -PG           User Key  (r) = Recorded By, (t) = Taken By, (c) = Cosigned By    Initials Name Provider Type    PG Homero Garcia OT Occupational Therapist    Stephanie Loya RN Registered Nurse                Occupational Therapy Education     Title: PT OT SLP Therapies (Done)     Topic: Occupational Therapy (Done)     Point: ADL training (Done)     Description:   Instruct learner(s) on proper safety adaptation and remediation techniques during self care or transfers.   Instruct in proper use of assistive devices.              Learning Progress Summary           Patient Acceptance, E,D, DU by PG at 1/11/2023 0905                   Point: Home exercise program (Done)     Description:   Instruct learner(s) on appropriate technique for monitoring, assisting and/or progressing therapeutic exercises/activities.              Learning Progress Summary           Patient Acceptance, E,D, DU by PG at 1/11/2023 0905                   Point: Precautions (Done)     Description:   Instruct learner(s) on prescribed precautions during self-care and functional transfers.              Learning Progress Summary           Patient Acceptance, E,D, DU by PG at 1/11/2023 0905                   Point: Body mechanics (Done)     Description:   Instruct learner(s) on proper positioning and spine alignment during self-care, functional mobility activities and/or exercises.              Learning Progress Summary           Patient Acceptance, E,D, DU by PG at 1/11/2023 0905                               User Key     Initials Effective Dates Name Provider Type Discipline     06/16/21 -  Homero Garcia OT Occupational Therapist OT              OT Recommendation and Plan  Planned Therapy Interventions (OT): BADL retraining, transfer/mobility retraining, patient/caregiver education/training, occupation/activity based interventions  Therapy Frequency (OT): 5 times/wk  Plan of Care Review  Plan of Care Reviewed  With: patient  Progress: no change  Outcome Evaluation: Patient presents with limitations affecting strength, activity tolerance, and balance impacting patient's ability to return home safely and independently.  The skills of a therapist will be required to safely and effectively implement the following treatment plan to restore maximal level of function     Time Calculation:    Time Calculation- OT     Row Name 01/11/23 0908             Time Calculation- OT    OT Received On 01/11/23  -PG      OT Goal Re-Cert Due Date 01/20/23  -PG         Timed Charges    20744 - OT Therapeutic Activity Minutes 10  -PG      53711 - OT Self Care/Mgmt Minutes 15  -PG         Untimed Charges    OT Eval/Re-eval Minutes 20  -PG         Total Minutes    Timed Charges Total Minutes 25  -PG      Untimed Charges Total Minutes 20  -PG       Total Minutes 45  -PG            User Key  (r) = Recorded By, (t) = Taken By, (c) = Cosigned By    Initials Name Provider Type    PG Homero Garcia OT Occupational Therapist              Therapy Charges for Today     Code Description Service Date Service Provider Modifiers Qty    87047032548 HC OT THERAPEUTIC ACT EA 15 MIN 1/11/2023 Homero Garcia OT GO 1    17654555816 HC OT SELF CARE/MGMT/TRAIN EA 15 MIN 1/11/2023 Homero Garcia OT GO 1    99257694361 HC OT EVAL LOW COMPLEXITY 2 1/11/2023 Homero Garcia OT GO 1               Homero Garcia OT  1/11/2023

## 2023-01-11 NOTE — PLAN OF CARE
Goal Outcome Evaluation:  Plan of Care Reviewed With: patient, spouse           Outcome Evaluation: Pt. presents with L knee weakness that impairs his ability to ambulate and perform functional transfers independently. Outpatient therapy services recommended to address deficits and achieve highest level of functional independence.

## 2023-01-11 NOTE — DISCHARGE INSTR - APPOINTMENTS
Outpatient PT  [facility]  APPT: 1/12/23 @ 1:00    Follow-up PCP  Dr. Larkin   [phone]  APPT: 1/18/23 @ 11:00

## 2023-01-11 NOTE — CASE MANAGEMENT/SOCIAL WORK
Discharge Planning Assessment   [place]     Patient Name: Ramon Holman  MRN: [id number]  Today's Date: 1/11/2023    Admit Date: 1/10/2023    Plan: Patient is s/p LTKR. RN CM met with patient at bedside. Facesheet reviewed, RN CM role and discharge planning discussed. Patient is independent at home with ADLs. Patient is requesting a rolling walker and 3 in 1 bedside commode at discharge, [facility] to provide. Patient will be following up with [facility], 1/12/23 at 1:00pm upond discharge. Spouse to provide transportation home at discharge. RN CM will continue to follow.   Discharge Needs Assessment     Row Name 01/11/23 0948       Living Environment    People in Home spouse    Current Living Arrangements home    Primary Care Provided by self    Provides Primary Care For no one    Family Caregiver if Needed spouse    Quality of Family Relationships helpful;involved;supportive    Able to Return to Prior Arrangements yes       Resource/Environmental Concerns    Resource/Environmental Concerns none    Transportation Concerns none       Transition Planning    Patient/Family Anticipates Transition to home with family    Patient/Family Anticipated Services at Transition outpatient care;rehabilitation services;durable medical equipment    Transportation Anticipated family or friend will provide       Discharge Needs Assessment    Readmission Within the Last 30 Days no previous admission in last 30 days    Equipment Currently Used at Home none    Anticipated Changes Related to Illness none    Equipment Needed After Discharge walker, rolling    Discharge Facility/Level of Care Needs outpatient therapy    Provided Post Acute Provider List? N/A    Provided Post Acute Provider Quality & Resource List? N/A               Discharge Plan     Row Name 01/11/23 0951       Plan    Plan Patient is s/p LTKR. RN CM met with patient at bedside. Facesheet reviewed, RN CM role and discharge planning discussed. Patient is independent at home  with ADLs. Patient is requesting a rolling walker and 3 in 1 bedside commode at discharge, [facility] to provide. Patient will be following up with [facility], 1/12/23 at 1:00pm upond discharge. Spouse to provide transportation home at discharge. RN CM will continue to follow.    Final Discharge Disposition Code 01 - home or self-care              Continued Care and Services - Admitted Since 1/10/2023     Durable Medical Equipment     Service Provider Request Status Selected Services Address Phone Fax Patient Preferred    [facility] [place] Pending - Request Sent N/A [address] [place] [address] [phone] --              Expected Discharge Date and Time     Expected Discharge Date Expected Discharge Time    Jan 11, 2023          Demographic Summary     Row Name 01/11/23 0947       General Information    Admission Type inpatient    Arrived From home;PACU/recovery room    Referral Source admission list    Reason for Consult discharge planning    Preferred Language English       Contact Information    Permission Granted to Share Info With family/designee    Contact Information Obtained for                Functional Status     Row Name 01/11/23 0948       Functional Status    Usual Activity Tolerance good    Current Activity Tolerance good       Assessment of Health Literacy    How often do you have someone help you read hospital materials? Occasionally    How often do you have problems learning about your medical condition because of difficulty understanding written information? Never    How often do you have a problem understanding what is told to you about your medical condition? Never    How confident are you filling out medical forms by yourself? Quite a bit    Health Literacy Good       Functional Status, IADL    Medications independent    Meal Preparation independent    Housekeeping independent    Laundry independent    Shopping independent       Mental Status     General Appearance WDL WDL       Mental Status Summary    Recent Changes in Mental Status/Cognitive Functioning no changes               Psychosocial    No documentation.                Abuse/Neglect    No documentation.                Legal    No documentation.                Substance Abuse    No documentation.                Patient Forms    No documentation.                   Shirley Beckham RN

## 2023-01-11 NOTE — THERAPY EVALUATION
Acute Care - Physical Therapy Initial Evaluation   [facility]     Patient Name: Ramon Holman  : 1955  MRN: [id number]  Today's Date: 2023   Admit date: 1/10/2023     Referring Physician: Jason Low DO     Surgery Date:1/10/2023   Procedure(s) (LRB):  LEFT TOTAL KNEE ARTHROPLASTY WITH WILIAN ROBOT (Left)         Visit Dx:     ICD-10-CM ICD-9-CM   1. Osteoarthrosis, localized, primary, knee, left  M17.12 715.16   2. Bilateral primary osteoarthritis of knee  M17.0 715.16   3. Decreased activities of daily living (ADL)  Z78.9 V49.89   4. Difficulty in walking  R26.2 719.7     Patient Active Problem List   Diagnosis   • Osteoarthrosis, localized, primary, knee, left   • Bilateral primary osteoarthritis of knee     Past Medical History:   Diagnosis Date   • Arthritis    • GERD (gastroesophageal reflux disease)    • Hyperlipidemia      Past Surgical History:   Procedure Laterality Date   • APPENDECTOMY     • COLONOSCOPY      X2   • ENDOSCOPY      X2   • KNEE ARTHROSCOPY Bilateral     2 LEFT 1 RIGHT   • TOTAL KNEE ARTHROPLASTY Left 1/10/2023    Procedure: LEFT TOTAL KNEE ARTHROPLASTY WITH WILIAN ROBOT;  Surgeon: Vladimir Vanegas MD;  Location: [facility] MAIN OR;  Service: Robotics - Ortho;  Laterality: Left;     PT Assessment (last 12 hours)     PT Evaluation and Treatment     Row Name 23 1117          Physical Therapy Time and Intention    Subjective Information complains of;pain  -NICOLETTE     Document Type evaluation  -NICOLETTE     Mode of Treatment individual therapy  -NICOLETTE     Patient Effort excellent  -NICOLETTE     Row Name 23 1117          General Information    Patient Profile Reviewed yes  -NICOLETTE     Patient Observations alert;cooperative;agree to therapy  -NICOLETTE     Prior Level of Function independent:  -NICOLETTE     Existing Precautions/Restrictions weight bearing  WBAT  -NICOLETTE     Barriers to Rehab none identified  -NICOLETTE     Row Name 23 111          Living Environment    Current Living Arrangements home  -NICOLETTE     People  in Home spouse  -NICOLETTE     Primary Care Provided by self;spouse/significant other  -NICOLETTE     Row Name 01/11/23 1117          Range of Motion (ROM)    Range of Motion left lower extremity  Knee 5-90  -NICOLETTE     Row Name 01/11/23 1117          Strength (Manual Muscle Testing)    Strength (Manual Muscle Testing) left lower extremity  4/5 Knee Flex/Ext, Ankle DF and Hip Flexion  -NICOLETTE     Row Name 01/11/23 1117          Mobility    Extremity Weight-bearing Status left lower extremity  -NICOLETTE     Left Lower Extremity (Weight-bearing Status) weight-bearing as tolerated (WBAT)  -NICOLETTE     Row Name 01/11/23 1117          Bed Mobility    Bed Mobility bed mobility (all) activities  -NICOLETTE     All Activities, [place] (Bed Mobility) independent  -NICOLETTE     Row Name 01/11/23 1117          Transfers    Transfers sit-stand transfer;stand-sit transfer  -NICOLETTE     Maintains Weight-bearing Status (Transfers) able to maintain  -NICOLETTE     Row Name 01/11/23 1117          Bed-Chair Transfer    Bed-Chair [place] (Transfers) standby assist  -NICOLETTE     Assistive Device (Bed-Chair Transfers) walker, front-wheeled  -NICOLETTE     Row Name 01/11/23 1117          Sit-Stand Transfer    Sit-Stand [place] (Transfers) standby assist  -NICOLETTE     Assistive Device (Sit-Stand Transfers) walker, front-wheeled  -NICOLETTE     Row Name 01/11/23 1117          Stand-Sit Transfer    Stand-Sit [place] (Transfers) standby assist  -NICOLETTE     Assistive Device (Stand-Sit Transfers) walker, front-wheeled  -NICOLETTE     Row Name 01/11/23 1117          Gait/Stairs (Locomotion)    Gait/Stairs Locomotion gait/ambulation assistive device  -NICOLETTE     [place] Level (Gait) standby assist  -NICOLETTE     Assistive Device (Gait) walker, front-wheeled  -NICOLETTE     Ambulated day of surgery or within 4 hours of PACU discharge yes  -NICOLETTE     Distance in Feet (Gait) 300  -NICOLETTE     Pattern (Gait) step-through  -NICOLETTE     Deviations/Abnormal Patterns (Gait) eric decreased  -NICOLETTE     Left Sided Gait Deviations weight shift ability  decreased  -NICOLETTE     Negotiation (Stairs) number of steps  5  -NICOLETTE     [place] Level (Stairs) contact guard  -NICOLETTE     Handrail Location (Stairs) both sides  -NICOLETTE     Number of Steps (Stairs) 5  -NICOLETTE     Ascending Technique (Stairs) step-to-step  -NICOLETTE     Descending Technique (Stairs) step-to-step  -NICOLETTE     Row Name 01/11/23 1117          Balance    Balance Assessment standing dynamic balance  -NICOLETTE     Dynamic Standing Balance supervision  -NICOLETTE     Position/Device Used, Standing Balance walker, front-wheeled  -NICOLETTE     Row Name             Wound 01/10/23 1355 Left anterior knee Incision    Wound - Properties Group Placement Date: 01/10/23  -SC Placement Time: 1355  -SC Present on Hospital Admission: N  -SC Side: Left  -SC Orientation: anterior  -SC Location: knee  -SC Primary Wound Type: Incision  -SC    Retired Wound - Properties Group Placement Date: 01/10/23  -SC Placement Time: 1355  -SC Present on Hospital Admission: N  -SC Side: Left  -SC Orientation: anterior  -SC Location: knee  -SC Primary Wound Type: Incision  -SC    Retired Wound - Properties Group Date first assessed: 01/10/23  -SC Time first assessed: 1355  -SC Present on Hospital Admission: N  -SC Side: Left  -SC Location: knee  -SC Primary Wound Type: Incision  -SC    Row Name 01/11/23 1117          Plan of Care Review    Plan of Care Reviewed With patient;spouse  -NICOLETTE     Outcome Evaluation Pt. presents with L knee weakness that impairs his ability to ambulate and perform functional transfers independently. Outpatient therapy services recommended to address deficits and achieve highest level of functional independence.  -NICOLETTE     Row Name 01/11/23 1117          Positioning and Restraints    Pre-Treatment Position sitting in chair/recliner  -NICOLETTE     Post Treatment Position chair  -NICOLETTE     In Chair reclined  -NICOLETTE     Row Name 01/11/23 1117          Therapy Assessment/Plan (PT)    Criteria for Skilled Interventions Met (PT) yes  Follow-up with outpatient therapy for  decreased knee strength and ROM  -NICOLETTE     Therapy Frequency (PT) evaluation only  -NICOLETTE     Row Name 01/11/23 1117          Therapy Plan Review/Discharge Plan (PT)    Therapy Plan Review (PT) evaluation/treatment results reviewed;patient;spouse/significant other  -NICOLETTE           User Key  (r) = Recorded By, (t) = Taken By, (c) = Cosigned By    Initials Name Provider Type    Glendy Montes RN Registered Nurse    Sunny Islas, PT Physical Therapist                Physical Therapy Education     Title: PT OT SLP Therapies (Done)     Topic: Physical Therapy (Done)     Point: Mobility training (Done)     Learning Progress Summary           Patient Acceptance, E, VU,DU by NICOLETTE at 1/11/2023 1129                   Point: Precautions (Done)     Learning Progress Summary           Patient Acceptance, E, VU,DU by NICOLETTE at 1/11/2023 1129                               User Key     Initials Effective Dates Name Provider Type Discipline    NICOLETTE 06/03/21 -  Sunny Mejia, PT Physical Therapist PT              PT Recommendation and Plan  Anticipated Discharge Disposition (PT): home with outpatient therapy services  Therapy Frequency (PT): evaluation only  Plan of Care Reviewed With: patient, spouse  Outcome Evaluation: Pt. presents with L knee weakness that impairs his ability to ambulate and perform functional transfers independently. Outpatient therapy services recommended to address deficits and achieve highest level of functional independence.   Outcome Measures     Row Name 01/11/23 1100             How much help from another person do you currently need...    Turning from your back to your side while in flat bed without using bedrails? 4  -NICOLETTE      Moving from lying on back to sitting on the side of a flat bed without bedrails? 4  -NICOLETTE      Moving to and from a bed to a chair (including a wheelchair)? 4  -NICOLETTE      Standing up from a chair using your arms (e.g., wheelchair, bedside chair)? 4  -NICOLETTE      Climbing 3-5 steps with  a railing? 3  -NICOLETTE      To walk in hospital room? 4  -NICOLETTE      AM-PAC 6 Clicks Score (PT) 23  -NICOLETTE            User Key  (r) = Recorded By, (t) = Taken By, (c) = Cosigned By    Initials Name Provider Type    Sunny Islas, PT Physical Therapist                 Time Calculation:    PT Charges     Row Name 01/11/23 1117             Time Calculation    PT Received On 01/11/23  -NICOLETTE      PT Goal Re-Cert Due Date 01/20/23  -NICOLETTE         Timed Charges    88697 - PT Therapeutic Exercise Minutes 8  -NICOLETTE      69032 - Gait Training Minutes  5  -NICOLETTE         Untimed Charges    PT Eval/Re-eval Minutes 40  -NICOLETTE         Total Minutes    Timed Charges Total Minutes 13  -NICOLETTE      Untimed Charges Total Minutes 40  -NICOLETTE       Total Minutes 53  -NICOLETTE            User Key  (r) = Recorded By, (t) = Taken By, (c) = Cosigned By    Initials Name Provider Type    Sunny Islas, MARK Physical Therapist              Therapy Charges for Today     Code Description Service Date Service Provider Modifiers Qty    98727105690 HC PT THER PROC EA 15 MIN 1/11/2023 Sunny Mejia, PT GP 1    28923831236 HC PT EVAL LOW COMPLEXITY 3 1/11/2023 Sunny Mejia, PT GP 1          PT G-Codes  Outcome Measure Options: AM-PAC 6 Clicks Daily Activity (OT), Optimal Instrument  AM-PAC 6 Clicks Score (PT): 23  AM-PAC 6 Clicks Score (OT): 19    Sunny Mejia, PT  1/11/2023

## 2023-01-11 NOTE — DISCHARGE INSTRUCTIONS
Aquacell dressing changed on Post op day 3,   1/13/23  Clean incision with alcohol pads and apply new aquacell bandage. Leave on until 1/16/23  Then start daily dressing changes until follow up ortho appointment. Take off old banadage, clean site with alcohol pads and apply ABD pad, tape down.    With extra incision from Trena robot, start daily dressing changes on post op day 3. Clean with alcohol pads. Cover with gauze and tape.

## 2023-01-12 ENCOUNTER — TELEPHONE (OUTPATIENT)
Dept: ORTHOPEDIC SURGERY | Facility: CLINIC | Age: 68
End: 2023-01-12
Payer: MEDICARE

## 2023-01-12 DIAGNOSIS — Z47.1 AFTERCARE FOLLOWING LEFT KNEE JOINT REPLACEMENT SURGERY: Primary | ICD-10-CM

## 2023-01-12 DIAGNOSIS — Z96.652 AFTERCARE FOLLOWING LEFT KNEE JOINT REPLACEMENT SURGERY: Primary | ICD-10-CM

## 2023-01-12 NOTE — TELEPHONE ENCOUNTER
Caller: SHYLA AGUIAR    Relationship: Emergency Contact    Best call back number:     What was the call regarding: THE PATIENT'S WIFE STATED THAT THE HYDROCODONE THE PATIENT WAS PRESCRIBED AFTER HIS LEFT TOTAL KNEE ARTHROPLASTY WITH WILIAN ROBOT ON 1/10/23 IS NOT HELPING THE PATIENT'S PAIN.   THE PATIENT WENT TO PHYSICAL THERAPY TODAY AND THEY COULDN'T DO MUCH TODAY BECAUSE OF THE PAIN.  THEY WOULD LIKE TO KNOW IF DR RICHEY COULD PRESCRIBE SOMETHING ELSE TO HELP HIS PAIN.

## 2023-01-12 NOTE — TELEPHONE ENCOUNTER
PATIENT IS A TOTAL JOINT AND IS SET UP AT [facility] AT 1PM TODAY.  NO ORDERS NEEDED, THE ORDER WAS ALREADY SENT

## 2023-01-12 NOTE — TELEPHONE ENCOUNTER
PATIENT CALLED TO CHECK ON STATUS OF MESSAGE. STATES HE IS IN SOME HORRIBLE PAIN.     PATIENT ALSO WANTING TO USE [facility]. PLEASE ADVISE.     [facility] [id number] - [place] - [address] AT [facility] [address] - [phone]  - [phone] FX

## 2023-01-12 NOTE — THERAPY DISCHARGE NOTE
Inpatient Rehabilitation - Occupational Therapy Discharge   Chan    Patient Name: Ramon Holman  : 1955    MRN: [id number]                              Today's Date: 2023       Admit Date: 1/10/2023    Visit Dx:     ICD-10-CM ICD-9-CM   1. Osteoarthrosis, localized, primary, knee, left  M17.12 715.16   2. Bilateral primary osteoarthritis of knee  M17.0 715.16   3. Decreased activities of daily living (ADL)  Z78.9 V49.89   4. Difficulty in walking  R26.2 719.7     Patient Active Problem List   Diagnosis   • Osteoarthrosis, localized, primary, knee, left   • Bilateral primary osteoarthritis of knee     Past Medical History:   Diagnosis Date   • Arthritis    • GERD (gastroesophageal reflux disease)    • Hyperlipidemia      Past Surgical History:   Procedure Laterality Date   • APPENDECTOMY     • COLONOSCOPY      X2   • ENDOSCOPY      X2   • KNEE ARTHROSCOPY Bilateral     2 LEFT 1 RIGHT   • TOTAL KNEE ARTHROPLASTY Left 1/10/2023    Procedure: LEFT TOTAL KNEE ARTHROPLASTY WITH WILIAN ROBOT;  Surgeon: Vladimir Vanegas MD;  [facility] OR;  Service: Robotics - Ortho;  Laterality: Left;      General Information     Row Name 2330          OT Time and Intention    Document Type discharge evaluation/summary  -PG     Mode of Treatment occupational therapy  -PG           User Key  (r) = Recorded By, (t) = Taken By, (c) = Cosigned By    Initials Name Provider Type    PG Homero Garcia OT Occupational Therapist               Mobility/ADL's    No documentation.                Obj/Interventions    No documentation.                Goals/Plan     Row Name 23          Transfer Goal 1 (OT)    Progress/Outcome (Transfer Goal 1, OT) discharged from facility  -PG     Row Name 23          Bathing Goal 1 (OT)    Progress/Outcomes (Bathing Goal 1, OT) discharged from facility  -PG     Row Name 23          Dressing Goal 1 (OT)    Progress/Outcome (Dressing Goal 1, OT)  discharged from facility  -PG     Row Name 01/12/23 0530          Toileting Goal 1 (OT)    Progress/Outcome (Toileting Goal 1, OT) discharged from facility  -PG     Row Name 01/12/23 0530          Grooming Goal 1 (OT)    Progress/Outcome (Grooming Goal 1, OT) discharged from facility  -PG           User Key  (r) = Recorded By, (t) = Taken By, (c) = Cosigned By    Initials Name Provider Type    Homero Alegria OT Occupational Therapist               Clinical Impression    No documentation.                Outcome Measures    No documentation.               Occupational Therapy Education     Title: PT OT SLP Therapies (Done)     Topic: Occupational Therapy (Resolved)     Point: ADL training (Resolved)     Description:   Instruct learner(s) on proper safety adaptation and remediation techniques during self care or transfers.   Instruct in proper use of assistive devices.              Learning Progress Summary           Patient Acceptance, E,D, DU by PG at 1/11/2023 0905                   Point: Home exercise program (Resolved)     Description:   Instruct learner(s) on appropriate technique for monitoring, assisting and/or progressing therapeutic exercises/activities.              Learning Progress Summary           Patient Acceptance, E,D, DU by PG at 1/11/2023 0905                   Point: Precautions (Resolved)     Description:   Instruct learner(s) on prescribed precautions during self-care and functional transfers.              Learning Progress Summary           Patient Acceptance, E,D, DU by PG at 1/11/2023 0905                   Point: Body mechanics (Resolved)     Description:   Instruct learner(s) on proper positioning and spine alignment during self-care, functional mobility activities and/or exercises.              Learning Progress Summary           Patient Acceptance, E,D, DU by PG at 1/11/2023 0905                               User Key     Initials Effective Dates Name Provider Type Discipline    PG  06/16/21 -  Homero Garcia OT Occupational Therapist OT              OT Recommendation and Plan  Planned Therapy Interventions (OT): BADL retraining, transfer/mobility retraining, patient/caregiver education/training, occupation/activity based interventions  Therapy Frequency (OT): 5 times/wk  Plan of Care Review  Plan of Care Reviewed With: patient  Progress: no change  Outcome Evaluation: Patient presents with limitations affecting strength, activity tolerance, and balance impacting patient's ability to return home safely and independently.  The skills of a therapist will be required to safely and effectively implement the following treatment plan to restore maximal level of function  Plan of Care Reviewed With: patient  Outcome Evaluation: Patient presents with limitations affecting strength, activity tolerance, and balance impacting patient's ability to return home safely and independently.  The skills of a therapist will be required to safely and effectively implement the following treatment plan to restore maximal level of function     Time Calculation:     Therapy Charges for Today     Code Description Service Date Service Provider Modifiers Qty    56996966062  OT THERAPEUTIC ACT EA 15 MIN 1/11/2023 Homero Garcia OT GO 1    27444001064  OT SELF CARE/MGMT/TRAIN EA 15 MIN 1/11/2023 Homero Garcia OT GO 1    [id number]  OT EVAL LOW COMPLEXITY 2 1/11/2023 Homero Garcia OT GO 1             OT Discharge Summary  Anticipated Discharge Disposition (OT): home with outpatient therapy services  Reason for Discharge: Discharge from facility  Outcomes Achieved: Discharge from facility occurred on same date as evluation  Discharge Destination: Home with outpatient services    Homero Garcia OT  1/12/2023

## 2023-01-12 NOTE — TELEPHONE ENCOUNTER
----- Message from Mart Fairbanks sent at 2023 12:45 PM EST -----  Regarding: RE: DR. RICHEY PT NEEDS NEW ORDER FOR PT  ADRIAN ..Ramon Holman : 1955  ----- Message -----  From: Sushila Ibrahim  Sent: 2023  11:10 AM EST  To: Mart Fairbanks  Subject: RE: DR. RICHEY PT NEEDS NEW ORDER FOR PT           This has no patient name on this.  Who is this for?  ----- Message -----  From: Kim Dahl RegSched Rep  Sent: 2023  10:55 AM EST  To: [facility]  Subject: DR. RICHEY PT NEEDS NEW ORDER FOR PT               DR. RICHEY PT NEEDS NEW ORDER FOR PT .THANKS !

## 2023-01-13 RX ORDER — OXYCODONE AND ACETAMINOPHEN 7.5; 325 MG/1; MG/1
1 TABLET ORAL EVERY 4 HOURS PRN
Qty: 42 TABLET | Refills: 0 | Status: SHIPPED | OUTPATIENT
Start: 2023-01-13 | End: 2023-01-23 | Stop reason: SDUPTHER

## 2023-01-13 RX ORDER — OXYCODONE AND ACETAMINOPHEN 7.5; 325 MG/1; MG/1
1 TABLET ORAL EVERY 4 HOURS PRN
Qty: 42 TABLET | Refills: 0 | Status: SHIPPED | OUTPATIENT
Start: 2023-01-13

## 2023-01-13 RX ORDER — METAXALONE 800 MG/1
800 TABLET ORAL 2 TIMES DAILY
Qty: 15 TABLET | Refills: 0 | Status: SHIPPED | OUTPATIENT
Start: 2023-01-13

## 2023-01-13 NOTE — TELEPHONE ENCOUNTER
POST OP PHONE CALL.  PATIENT IS HAVING MUSCLE SPASMS AND EXTREME PAIN .  I HAVE TALKED HIM THROUGH OTHER PAIN RELIEVING MEASURES.  REQUESTING STRONGER PAIN MEDS AND MUSCLE RELAXER.  NESTOR AT [facility]   REASON FOR CONSULTATION/VISIT:  Chief Complaint   Patient presents with   • Consultation     Head Shape   • Office Visit       REFERRING PROVIDER:   Verify Pcp    BIRTH HISTORY:  Ramy Rios Born at 39 weeks by vaginal delivery to a 27 year old   female. The patients  4.125 kg (9 lb 1.5 oz),otherwise uncomplicated pregnancy and delivery.    HISTORY OF PRESENT ILLNESS:  Ramy Rios is a 3 month old male here for evaluation of headshape. The patient has been seen by their pediatrician, who noted an abnormality in the shape of the head a few months ago and recommended at home position changes, and tummy time. With only slight improvement, their pediatrician referred the family to our clinic.   The patient has not received formal physical therapy.   This was noted at the 2 month visit and it was felt to be worsening at 3 month visit and therefore referred to our head shape clinic.    There is no abnormal findings on the most recent well-child visit.    ALLERGIES:  ALLERGIES:  No Known Allergies    CURRENT MEDICATIONS:   No current outpatient medications on file.     No current facility-administered medications for this visit.        PAST MEDICAL AND SURGICAL HISTORY:  History reviewed. No pertinent past medical history.  History reviewed. No pertinent surgical history.       ROS:   Review of Systems   All other systems reviewed and are negative.      PHYSICAL EXAM:  Constitutional: Alert, no acute distress.  Head Shape: Parallelogram shape with  left posterior flat, right bulge, + reciprocal changes, + offset to ears  The anterior fontanelle is open  Cranial sutures: normal  Neck: tilt to right shoulder, asymmetric righting reactions  Eyes: PERRL, EOMI  ENT: Moist mucous membranes. No cleft lip or palate deformity.  Respiratory: Non-labored breathing. Symmetrical chest wall expansion.  Cardiovascular: Extremities warm well perfused.  Musculoskeletal: No obvious weakness.  Neurologic: Appropriate  behavior. Cranial nerves 2-12 grossly intact.  Psychological: Appropriate mood and behavior.      IMPRESSION:  Ramy Rios is a 3 month old male who presents to clinic for evaluation regarding their head shape.  On exam at this visit we find left posterior plagiocephaly and right torticollis  The patient has a cranial index of 0.859 and a diagonal difference of 13.6 mm    PLAN:  1. Physical therapy to address weakness and ROM  2.follow up 2 months       Yann Green MD, FACS, FAAP  Chief; Pediatric Plastic Surgery - Craniofacial Surgery  [facility]      We thank you for the opportunity participate in this patient's care.    -Family provided with information regarding diagnosis and treatment options. All questions were answered. Family verbalized understanding of education provided.   -Discussed current exam, diagnosis, natural progression, and treatment options.    No primary diagnosis found.    No orders of the defined types were placed in this encounter.              2020

## 2023-01-16 NOTE — TELEPHONE ENCOUNTER
I spoke with patient and explained that a different pain medication was called in on Friday as well as a muscle relaxer.  He asked what the difference was and I explained that he received Hydrocodone after surgery and that Oxycodone was sent in on Friday.  He voiced understanding and thanked me for calling.

## 2023-01-16 NOTE — TELEPHONE ENCOUNTER
PT STATING PAIN MEDICATION THAT WAS PRESCRIBED IS THE SAME ONE HE TRIED BEFORE. PT CONCERNING PAIN IS NOT DECREASING AND IT HAS BEEN AFFECTING NORMAL DAY ACTIVITIES INCLUDING PHYSICAL THERAPY. PT FEELING DEPRESSED BECAUSE OF THIS AND WOULD LIKE SOME MEDICAL ADVISE ASAP.

## 2023-01-23 ENCOUNTER — OFFICE VISIT (OUTPATIENT)
Dept: ORTHOPEDIC SURGERY | Facility: CLINIC | Age: 68
End: 2023-01-23
Payer: MEDICARE

## 2023-01-23 VITALS — OXYGEN SATURATION: 98 % | WEIGHT: 190 LBS | BODY MASS INDEX: 26.6 KG/M2 | HEIGHT: 71 IN

## 2023-01-23 DIAGNOSIS — Z47.1 AFTERCARE FOLLOWING LEFT KNEE JOINT REPLACEMENT SURGERY: ICD-10-CM

## 2023-01-23 DIAGNOSIS — Z96.652 AFTERCARE FOLLOWING LEFT KNEE JOINT REPLACEMENT SURGERY: ICD-10-CM

## 2023-01-23 DIAGNOSIS — M25.562 LEFT KNEE PAIN, UNSPECIFIED CHRONICITY: Primary | ICD-10-CM

## 2023-01-23 DIAGNOSIS — Z47.1 AFTERCARE FOLLOWING LEFT KNEE JOINT REPLACEMENT SURGERY: Primary | ICD-10-CM

## 2023-01-23 DIAGNOSIS — Z96.652 AFTERCARE FOLLOWING LEFT KNEE JOINT REPLACEMENT SURGERY: Primary | ICD-10-CM

## 2023-01-23 PROCEDURE — 99024 POSTOP FOLLOW-UP VISIT: CPT | Performed by: PHYSICIAN ASSISTANT

## 2023-01-23 RX ORDER — OXYCODONE AND ACETAMINOPHEN 7.5; 325 MG/1; MG/1
1 TABLET ORAL EVERY 4 HOURS PRN
Qty: 42 TABLET | Refills: 0 | Status: SHIPPED | OUTPATIENT
Start: 2023-01-23

## 2023-01-23 NOTE — PROGRESS NOTES
"Chief Complaint  Follow-up of the Left Knee and Suture / Staple Removal    Subjective      Ramon Holman presents to [facility] for a follow up for his left knee. Patient underwent left knee arthoplasty with Trena with Dr. Vanegas on 01/10/23. Patient presents today for his 2 week postop appointment. He is ambulating with a walker today and is currently wearing his SAMANTHA hose. He is attending [facility] for physical therapy. He states he has been pretty stiff.     Objective   No Known Allergies    Vital Signs:   Ht 180.3 cm (71\")   Wt 86.2 kg (190 lb)   SpO2 98%   BMI 26.50 kg/m²       Physical Exam    Constitutional: Awake, alert. Well nourished appearance.    Integumentary: Warm, dry, intact. No obvious rashes.    HENT: Atraumatic, normocephalic.   Respiratory: Non labored respirations .   Cardiovascular: Intact peripheral pulses.    Psychiatric: Normal mood and affect. A&O X3    Ortho Exam    Left lower extremity: incision is clean, dry and intact, staples were removed today in the office and steri stripes were applied, moderate swelling, non tender over the medial and lateral joint line, -5 degrees of extension, 70 degrees of flexion, calf soft. Full ankle ROM. Sensation and distal neurovascular intact.     Imaging Results (Most Recent)     Procedure Component Value Units Date/Time    XR Knee 3 View Left [[id number]] Resulted: 01/23/23 1543     Updated: 01/23/23 1544    Narrative:      X-Ray Report:  Study: X-rays ordered, taken in the office, and reviewed today.   Site: Left knee Xray  Indication: TKA  View: AP/Lateral, Standing and Sunrise view(s)  Findings: Intact left total knee arthroplasty . No evidence of hardware   malfunction.   Prior studies available for comparison: yes                       Assessment and Plan   Problem List Items Addressed This Visit    None  Visit Diagnoses     Left knee pain, unspecified chronicity    -  Primary    Relevant Orders    XR Knee 3 View Left    "       Follow Up   Return in about 4 weeks (around 2/20/2023).  Patient is a non-smoker.  Did not discuss options for smoking cessation.    Patient Instructions   X-rays taken and reviewed, showing intact hardware.    Staples removed in office and Steri-Strips applied.  Patient educated on incision care.  Please keep incision clean and dry.  Do not soak or submerge in water until incision is fully healed.  Do not apply creams or lotions over the incision.  Please allow Steri-Strips to fall off on their own within 7 to 10 days.    Continue icing and elevation of the knee as needed to help with pain and swelling.  Ice knee up to 3 or 4 times daily for no longer than 15 to 20 minutes at a time.    Continue PT to progress ROM, strength, and weightbearing status.    Follow-up in 4 weeks. Repeat x-rays not needed at this visit.  Please call with questions or concerns.      Patient was given instructions and counseling regarding his condition or for health maintenance advice. Please see specific information pulled into the AVS if appropriate.

## 2023-01-23 NOTE — PATIENT INSTRUCTIONS
X-rays taken and reviewed, showing intact hardware.    Staples removed in office and Steri-Strips applied.  Patient educated on incision care.  Please keep incision clean and dry.  Do not soak or submerge in water until incision is fully healed.  Do not apply creams or lotions over the incision.  Please allow Steri-Strips to fall off on their own within 7 to 10 days.    Continue icing and elevation of the knee as needed to help with pain and swelling.  Ice knee up to 3 or 4 times daily for no longer than 15 to 20 minutes at a time.    Continue PT to progress ROM, strength, and weightbearing status.    Follow-up in 4 weeks. Repeat x-rays not needed at this visit.  Please call with questions or concerns.

## 2023-01-23 NOTE — TELEPHONE ENCOUNTER
Patient would like a refill on pain med. He was seen in office today by kesha but is a [facility] Patient.  can not refill pain meds until Thursday. Kwasi Thao.

## 2023-02-27 ENCOUNTER — OFFICE VISIT (OUTPATIENT)
Dept: ORTHOPEDIC SURGERY | Facility: CLINIC | Age: 68
End: 2023-02-27
Payer: MEDICARE

## 2023-02-27 VITALS — HEART RATE: 75 BPM | WEIGHT: 188 LBS | OXYGEN SATURATION: 95 % | BODY MASS INDEX: 26.32 KG/M2 | HEIGHT: 71 IN

## 2023-02-27 DIAGNOSIS — Z96.652 AFTERCARE FOLLOWING LEFT KNEE JOINT REPLACEMENT SURGERY: Primary | ICD-10-CM

## 2023-02-27 DIAGNOSIS — Z47.1 AFTERCARE FOLLOWING LEFT KNEE JOINT REPLACEMENT SURGERY: Primary | ICD-10-CM

## 2023-02-27 DIAGNOSIS — M25.562 LEFT KNEE PAIN, UNSPECIFIED CHRONICITY: ICD-10-CM

## 2023-02-27 PROCEDURE — 99024 POSTOP FOLLOW-UP VISIT: CPT | Performed by: PHYSICIAN ASSISTANT

## 2023-02-27 RX ORDER — ACETAMINOPHEN 500 MG
500 TABLET ORAL EVERY 6 HOURS PRN
COMMUNITY

## 2023-02-27 RX ORDER — HYDROCODONE BITARTRATE AND ACETAMINOPHEN 7.5; 325 MG/1; MG/1
1 TABLET ORAL EVERY 6 HOURS PRN
Qty: 28 TABLET | Refills: 0 | Status: SHIPPED | OUTPATIENT
Start: 2023-02-27

## 2023-02-27 NOTE — PATIENT INSTRUCTIONS
Patient is doing very well. Advised to continue PT to completion to progress strength and ROM. Updated PT order placed. Continue home exercises.     Continue icing knee as needed up to 4 times daily for no longer than 15-20 mins at a time.     Follow-up in 6 weeks. Repeat x-rays needed. Call with changes or concerns.

## 2023-02-27 NOTE — TELEPHONE ENCOUNTER
Patient seen in office today.  Requests refill of pain medication.  Send to [facility] pharmacy on [facility] [address] [facility].

## 2023-02-27 NOTE — PROGRESS NOTES
"Chief Complaint  Follow-up and Pain of the Left Knee    Subjective      Ramon Holman presents to [facility] for follow-up of left total knee arthroplasty with Terna robot performed on 1/10/2023 by Dr. Vanegas.  Patient presents today independently ambulatory without use of assistive device.  He reports that he is \"doing better\".  Does have some continued pain and stiffness.  Patient and PT feel he would benefit from additional physical therapy.  He remains in outpatient physical therapy through [facility].  PT reports patient was able to achieve 108 degrees of active knee flexion today and 112 degrees passively.  He does have continued difficulties with both flexion and extension, which limits his ability to sit and stand from toilet or standard chair without hand assist.    Objective   No Known Allergies    Vital Signs:   Pulse 75   Ht 180.3 cm (71\")   Wt 85.3 kg (188 lb)   SpO2 95%   BMI 26.22 kg/m²       Physical Exam    Constitutional: Awake, alert. Well nourished appearance.    Integumentary: Warm, dry, intact. No obvious rashes.    HENT: Atraumatic, normocephalic.   Respiratory: Non labored respirations .   Cardiovascular: Intact peripheral pulses.    Psychiatric: Normal mood and affect. A&O X3    Ortho Exam  Left knee: Moderate edema.  Skin is warm, dry, and intact.  Near complete healing noted to surgical scar.  He does have 3 small areas of scab formation.  There is no evidence of wound dehiscence, surrounding erythema, warmth, or drainage.  Patella is well tracking.  -5 degrees of knee extension and flexion to 110 degrees.  Full plantarflexion and dorsiflexion of the ankle.  Sensation intact light touch.  Distal neurovascular intact.  Patient is ambulatory with assistance of a cane, nonantalgic gait.    Imaging Results (Most Recent)     None                    Assessment and Plan   Problem List Items Addressed This Visit    None  Visit Diagnoses     Aftercare following left " knee joint replacement surgery    -  Primary    Relevant Orders    Ambulatory Referral to Physical Therapy POST OP (Completed)        Follow Up   Return in about 6 weeks (around 4/10/2023).  Patient is a non-smoker.  Did not discuss options for smoking cessation.    Patient Instructions   Patient is doing very well. Advised to continue PT to completion to progress strength and ROM. Updated PT order placed. Continue home exercises.     Continue icing knee as needed up to 4 times daily for no longer than 15-20 mins at a time.     Follow-up in 6 weeks. Repeat x-rays needed. Call with changes or concerns.       Patient was given instructions and counseling regarding his condition or for health maintenance advice. Please see specific information pulled into the AVS if appropriate.

## 2023-03-20 ENCOUNTER — TELEPHONE (OUTPATIENT)
Dept: ORTHOPEDIC SURGERY | Facility: CLINIC | Age: 68
End: 2023-03-20
Payer: MEDICARE

## 2023-03-20 NOTE — TELEPHONE ENCOUNTER
TRIED TO CALL PATIENT AND TALK TO HIM ABOUT PICTURES HE SENT OF A DRY AREA ON THE SIDE OF HIS INCISION.  PATIENT TO KEEP AREA CLEAN AND DRY AND NOT PLACE ANY OINTMENTS ON AREA.  OKAY FOR HUB TO RELEASE MESSAGE

## 2023-04-10 ENCOUNTER — OFFICE VISIT (OUTPATIENT)
Dept: ORTHOPEDIC SURGERY | Facility: CLINIC | Age: 68
End: 2023-04-10
Payer: MEDICARE

## 2023-04-10 VITALS — WEIGHT: 188 LBS | BODY MASS INDEX: 26.32 KG/M2 | HEIGHT: 71 IN

## 2023-04-10 DIAGNOSIS — Z96.652 AFTERCARE FOLLOWING LEFT KNEE JOINT REPLACEMENT SURGERY: Primary | ICD-10-CM

## 2023-04-10 DIAGNOSIS — Z47.1 AFTERCARE FOLLOWING LEFT KNEE JOINT REPLACEMENT SURGERY: Primary | ICD-10-CM

## 2023-04-10 PROCEDURE — 99024 POSTOP FOLLOW-UP VISIT: CPT | Performed by: PHYSICIAN ASSISTANT

## 2023-04-10 PROCEDURE — 1160F RVW MEDS BY RX/DR IN RCRD: CPT | Performed by: PHYSICIAN ASSISTANT

## 2023-04-10 PROCEDURE — 1159F MED LIST DOCD IN RCRD: CPT | Performed by: PHYSICIAN ASSISTANT

## 2023-04-10 NOTE — PROGRESS NOTES
"Chief Complaint  Pain and Follow-up of the Left Knee    Subjective      Ramon Holman presents to [facility] for follow-up of left total knee arthroplasty with Trena robot performed by Dr. MEGAN Vanegas on 1/10/2023.  He presents today independently ambulatory without use of assistive device.  He remains in outpatient physical therapy through [facility], attending once weekly and scheduled for an additional 3 weeks of therapy prior to anticipated completion.  Reports he has been able to achieve left knee extension of -3 degrees and flexion to 120 degrees.  He has returned to most of his normal activities.    Objective   No Known Allergies    Vital Signs:   Ht 180.3 cm (71\")   Wt 85.3 kg (188 lb)   BMI 26.22 kg/m²       Physical Exam    Constitutional: Awake, alert. Well nourished appearance.    Integumentary: Warm, dry, intact. No obvious rashes.    HENT: Atraumatic, normocephalic.   Respiratory: Non labored respirations .   Cardiovascular: Intact peripheral pulses.    Psychiatric: Normal mood and affect. A&O X3    Ortho Exam  Left knee: Skin is warm, dry, and intact.  Well-healed surgical scar noted.  Mild to moderate edema.  Patella is well tracking and knee is stable to varus and valgus stress.  -3 degrees of extension and flexion to 120 degrees.  Full plantarflexion and dorsiflexion of the ankle.  Sensation intact light touch.  Distal neurovascular intact.  Smooth sit to stand transition.  Patient is fully weightbearing with nonantalgic gait.    Imaging Results (Most Recent)     Procedure Component Value Units Date/Time    XR Knee 3 View Left [[id number]] Resulted: 04/10/23 1625     Updated: 04/10/23 1626    Narrative:      X-Ray Report:  Study: X-rays ordered, taken in the office, and reviewed today.   Site: Left knee Xray  Indication: TKA  View: AP/Lateral, Standing and Sunrise view(s)  Findings: Intact left total knee arthroplasty. No evidence of hardware   malfunction.   Prior studies " available for comparison: yes                       Assessment and Plan   Problem List Items Addressed This Visit    None  Visit Diagnoses     Aftercare following left knee joint replacement surgery    -  Primary    Relevant Orders    XR Knee 3 View Left (Completed)        Follow Up   Return in about 9 months (around 1/10/2024).  Patient is a non-smoker.  Did not discuss options for smoking cessation.    Patient Instructions   X-rays reviewed, showing hardware intact. Advised to continue PT to completion. Continue home exercise program to progress strength and ROM. Continue icing knee as needed up to 3-4 times daily for no longer than 15 to 20 minutes at a time.    Continue with lifelong antibiotic prophylaxis with dental procedures following total joint replacement.    Follow-up in 9 months. Call sooner, if needed with any changes or concerns. Repeat x-rays.       Patient was given instructions and counseling regarding his condition or for health maintenance advice. Please see specific information pulled into the AVS if appropriate.

## 2023-04-10 NOTE — PATIENT INSTRUCTIONS
X-rays reviewed, showing hardware intact. Advised to continue PT to completion. Continue home exercise program to progress strength and ROM. Continue icing knee as needed up to 3-4 times daily for no longer than 15 to 20 minutes at a time.    Continue with lifelong antibiotic prophylaxis with dental procedures following total joint replacement.    Follow-up in 9 months. Call sooner, if needed with any changes or concerns. Repeat x-rays.

## 2023-04-24 ENCOUNTER — OFFICE VISIT (OUTPATIENT)
Dept: ORTHOPEDIC SURGERY | Facility: CLINIC | Age: 68
End: 2023-04-24
Payer: MEDICARE

## 2023-04-24 VITALS — HEIGHT: 71 IN | BODY MASS INDEX: 26.32 KG/M2 | WEIGHT: 188 LBS

## 2023-04-24 DIAGNOSIS — M17.0 BILATERAL PRIMARY OSTEOARTHRITIS OF KNEE: Primary | ICD-10-CM

## 2023-04-24 NOTE — PROGRESS NOTES
"Chief Complaint  Pain and Follow-up of the Right Knee    Subjective      Ramon Holman presents to [facility] for follow-up of right knee osteoarthritis.  He presents independently ambulatory without use of assistive device.  He has recently undergone left total knee arthroplasty on 1/10/2023 by Dr. Vanegas and reports he is doing well from this standpoint.  He does have plans for future right total knee arthroplasty, although reports he would like to hold off until approximately mid November for this.  He would like to proceed with right knee steroid injection today.    Objective   No Known Allergies    Vital Signs:   Ht 180.3 cm (71\")   Wt 85.3 kg (188 lb)   BMI 26.22 kg/m²       Physical Exam    Constitutional: Awake, alert. Well nourished appearance.    Integumentary: Warm, dry, intact. No obvious rashes.    HENT: Atraumatic, normocephalic.   Respiratory: Non labored respirations .   Cardiovascular: Intact peripheral pulses.    Psychiatric: Normal mood and affect. A&O X3    Ortho Exam  Right knee: Skin is warm, dry, and intact.  Crepitus with ROM.  -2 degrees of extension and flexion to 115 degrees.  Full plantarflexion and dorsiflexion of the ankle.  Sensation intact light touch.  Distal neurovascular intact.  Smooth sit to stand transition.  Patient is fully weightbearing with nonantalgic gait.    Imaging Results (Most Recent)     None           Large Joint Arthrocentesis: R knee  Date/Time: 4/28/2023 9:04 AM  Consent given by: patient  Site marked: site marked  Timeout: Immediately prior to procedure a time out was called to verify the correct patient, procedure, equipment, support staff and site/side marked as required   Supporting Documentation  Indications: pain   Procedure Details  Location: knee - R knee  Needle gauge: 21g.  Medications administered: 5 mL lidocaine PF 1% 1 %; 80 mg methylPREDNISolone acetate 80 MG/ML  Patient tolerance: patient tolerated the procedure well " with no immediate complications              Assessment and Plan   Problem List Items Addressed This Visit        Musculoskeletal and Injuries    Bilateral primary osteoarthritis of knee - Primary    Overview     Added automatically from request for surgery [id number]          Follow Up   Return in about 6 weeks (around 6/5/2023).  Patient is a non-smoker.  Did not discuss options for smoking cessation.    Patient Instructions   Right knee steroid injection administered today in office. Advised on 3 month duration between injections. Will seek insurance authorization for visco R knee.     Follow up in 6 weeks for R knee visco, if needed. Call with questions or concerns.       Patient was given instructions and counseling regarding his condition or for health maintenance advice. Please see specific information pulled into the AVS if appropriate.

## 2023-04-24 NOTE — PATIENT INSTRUCTIONS
Right knee steroid injection administered today in office. Advised on 3 month duration between injections. Will seek insurance authorization for visco R knee.     Follow up in 6 weeks for R knee visco, if needed. Call with questions or concerns.

## 2023-04-26 ENCOUNTER — TELEPHONE (OUTPATIENT)
Dept: ORTHOPEDIC SURGERY | Facility: CLINIC | Age: 68
End: 2023-04-26
Payer: MEDICARE

## 2023-04-26 NOTE — TELEPHONE ENCOUNTER
THE PATIENT HAD RT KNEE STEROID INJ, BUT THERE IS NO ORDER ON THE OFFICE NOTE.  THANKS //MARIA DEL CARMEN

## 2023-04-28 RX ORDER — LIDOCAINE HYDROCHLORIDE 10 MG/ML
5 INJECTION, SOLUTION EPIDURAL; INFILTRATION; INTRACAUDAL; PERINEURAL
Status: COMPLETED | OUTPATIENT
Start: 2023-04-28 | End: 2023-04-28

## 2023-04-28 RX ORDER — METHYLPREDNISOLONE ACETATE 80 MG/ML
80 INJECTION, SUSPENSION INTRA-ARTICULAR; INTRALESIONAL; INTRAMUSCULAR; SOFT TISSUE
Status: COMPLETED | OUTPATIENT
Start: 2023-04-28 | End: 2023-04-28

## 2023-04-28 RX ADMIN — LIDOCAINE HYDROCHLORIDE 5 ML: 10 INJECTION, SOLUTION EPIDURAL; INFILTRATION; INTRACAUDAL; PERINEURAL at 09:04

## 2023-04-28 RX ADMIN — METHYLPREDNISOLONE ACETATE 80 MG: 80 INJECTION, SUSPENSION INTRA-ARTICULAR; INTRALESIONAL; INTRAMUSCULAR; SOFT TISSUE at 09:04

## 2023-06-12 ENCOUNTER — OFFICE VISIT (OUTPATIENT)
Dept: ORTHOPEDIC SURGERY | Facility: CLINIC | Age: 68
End: 2023-06-12
Payer: MEDICARE

## 2023-06-12 VITALS — HEIGHT: 71 IN | BODY MASS INDEX: 26.32 KG/M2 | WEIGHT: 188 LBS

## 2023-06-12 DIAGNOSIS — M17.0 BILATERAL PRIMARY OSTEOARTHRITIS OF KNEE: Primary | ICD-10-CM

## 2023-06-12 RX ORDER — HYALURONATE SODIUM 10 MG/ML
20 SYRINGE (ML) INTRAARTICULAR
Status: COMPLETED | OUTPATIENT
Start: 2023-06-12 | End: 2023-06-12

## 2023-06-12 RX ADMIN — Medication 20 MG: at 11:02

## 2023-06-12 NOTE — PATIENT INSTRUCTIONS
Right knee Euflexxa injection #1 administered in office today.  Follow-up in office in 1 week for next injection. Call with changes or concerns.

## 2023-06-12 NOTE — PROGRESS NOTES
"Chief Complaint  Pain and Follow-up of the Right Knee    Subjective      Ramon Holman presents to [facility] for follow-up of right knee pain and osteoarthritis, which he has managed conservatively with intermittent injections in the past.  He was last seen in office on 4/24/2023 and received right knee steroid injection.  He presents today independently ambulatory without use of assistive device.  Reports that previous injection \"lasted approximately 12 days\".  His right knee is once again painful.  He presents today with insurance authorization for initiation of right knee Euflexxa injection series.    Objective   No Known Allergies    Vital Signs:   Ht 180.3 cm (71\")   Wt 85.3 kg (188 lb)   BMI 26.22 kg/m²       Physical Exam    Constitutional: Awake, alert. Well nourished appearance.    Integumentary: Warm, dry, intact. No obvious rashes.    HENT: Atraumatic, normocephalic.   Respiratory: Non labored respirations .   Cardiovascular: Intact peripheral pulses.    Psychiatric: Normal mood and affect. A&O X3    Ortho Exam  Right knee: Skin is warm, dry, and intact.  Tenderness to palpation of joint lines.  Crepitus with ROM.  -3 degrees of extension and flexion 115 degrees.  Full plantarflexion and dorsiflexion of the ankle.  Sensation intact light touch.  Distal neurovascular intact.  Smooth sit to stand transition.  Patient fully weightbearing with mild antalgic gait.    Imaging Results (Most Recent)       None             Large Joint Arthrocentesis: R knee  Date/Time: 6/12/2023 11:02 AM  Consent given by: patient  Site marked: site marked  Timeout: Immediately prior to procedure a time out was called to verify the correct patient, procedure, equipment, support staff and site/side marked as required   Supporting Documentation  Indications: pain   Procedure Details  Location: knee - R knee  Needle gauge: 21g.  Medications administered: 20 mg Sodium Hyaluronate 20 MG/2ML  Patient " tolerance: patient tolerated the procedure well with no immediate complications            Assessment and Plan   Problem List Items Addressed This Visit          Musculoskeletal and Injuries    Bilateral primary osteoarthritis of knee - Primary    Overview     Added automatically from request for surgery [id number]            Follow Up   Return in about 1 week (around 6/19/2023).    Tobacco Use: Low Risk     Smoking Tobacco Use: Never    Smokeless Tobacco Use: Never    Passive Exposure: Not on file     Patient is a non-smoker.  Did not discuss tobacco cessation options.    Patient Instructions   Right knee Euflexxa injection #1 administered in office today.  Follow-up in office in 1 week for next injection. Call with changes or concerns.    Patient was given instructions and counseling regarding his condition or for health maintenance advice. Please see specific information pulled into the AVS if appropriate.

## 2023-06-19 ENCOUNTER — OFFICE VISIT (OUTPATIENT)
Dept: ORTHOPEDIC SURGERY | Facility: CLINIC | Age: 68
End: 2023-06-19
Payer: MEDICARE

## 2023-06-19 VITALS — WEIGHT: 188 LBS | BODY MASS INDEX: 26.32 KG/M2 | HEIGHT: 71 IN

## 2023-06-19 DIAGNOSIS — M17.0 BILATERAL PRIMARY OSTEOARTHRITIS OF KNEE: Primary | ICD-10-CM

## 2023-06-19 RX ORDER — HYALURONATE SODIUM 10 MG/ML
20 SYRINGE (ML) INTRAARTICULAR
Status: COMPLETED | OUTPATIENT
Start: 2023-06-19 | End: 2023-06-19

## 2023-06-19 RX ADMIN — Medication 20 MG: at 11:11

## 2023-06-19 NOTE — PATIENT INSTRUCTIONS
Right knee Euflexxa injection #2 administered in office today.  Follow-up in office in 1 week for next injection. Call with changes or concerns.

## 2023-06-19 NOTE — PROGRESS NOTES
"Chief Complaint  Pain and Follow-up of the Right Knee    Subjective      Ramon Holman presents to [facility] for follow-up of right knee pain and osteoarthritis which he has managed conservatively with intermittent injections in the past.  He was last seen in office on 6/12/2023 for initiation of right knee Euflexxa injection series.  Resents today independently ambulatory without use of assistive device.  Reports that his right knee is \"maybe a little better\".  He would like to proceed with right knee Euflexxa injection #2 in office today.    Objective   No Known Allergies    Vital Signs:   Ht 180.3 cm (71\")   Wt 85.3 kg (188 lb)   BMI 26.22 kg/m²       Physical Exam    Constitutional: Awake, alert. Well nourished appearance.    Integumentary: Warm, dry, intact. No obvious rashes.    HENT: Atraumatic, normocephalic.   Respiratory: Non labored respirations .   Cardiovascular: Intact peripheral pulses.    Psychiatric: Normal mood and affect. A&O X3    Ortho Exam  Right knee: Skin is warm, dry, and intact.  Tenderness to palpation of joint lines.  Crepitus with ROM.  -3 degrees of extension and flexion 115 degrees.  Full plantarflexion and dorsiflexion of the ankle.  Sensation intact to light touch.  Distal neurovascular intact.  Smooth sit to stand transition.  Patient fully weightbearing with mild antalgic gait.    Imaging Results (Most Recent)       None             Large Joint Arthrocentesis: R knee  Date/Time: 6/19/2023 11:11 AM  Consent given by: patient  Site marked: site marked  Timeout: Immediately prior to procedure a time out was called to verify the correct patient, procedure, equipment, support staff and site/side marked as required   Supporting Documentation  Indications: pain   Procedure Details  Location: knee - R knee  Needle gauge: 21g.  Medications administered: 20 mg Sodium Hyaluronate 20 MG/2ML  Patient tolerance: patient tolerated the procedure well with no " immediate complications            Assessment and Plan   Problem List Items Addressed This Visit          Musculoskeletal and Injuries    Bilateral primary osteoarthritis of knee - Primary    Overview     Added automatically from request for surgery [id number]            Follow Up   Return in about 1 week (around 6/26/2023).    Tobacco Use: Low Risk     Smoking Tobacco Use: Never    Smokeless Tobacco Use: Never    Passive Exposure: Not on file     Patient is a non-smoker.  Did not discuss tobacco cessation options.    Patient Instructions   Right knee Euflexxa injection #2 administered in office today.  Follow-up in office in 1 week for next injection. Call with changes or concerns.    Patient was given instructions and counseling regarding his condition or for health maintenance advice. Please see specific information pulled into the AVS if appropriate.

## 2023-10-23 ENCOUNTER — PREP FOR SURGERY (OUTPATIENT)
Dept: OTHER | Facility: HOSPITAL | Age: 68
End: 2023-10-23
Payer: MEDICARE

## 2023-10-23 ENCOUNTER — OFFICE VISIT (OUTPATIENT)
Dept: ORTHOPEDIC SURGERY | Facility: CLINIC | Age: 68
End: 2023-10-23
Payer: MEDICARE

## 2023-10-23 VITALS
HEART RATE: 83 BPM | WEIGHT: 188 LBS | HEIGHT: 71 IN | BODY MASS INDEX: 26.32 KG/M2 | SYSTOLIC BLOOD PRESSURE: 158 MMHG | DIASTOLIC BLOOD PRESSURE: 91 MMHG | OXYGEN SATURATION: 96 %

## 2023-10-23 DIAGNOSIS — M17.11 OSTEOARTHRITIS OF RIGHT KNEE, UNSPECIFIED OSTEOARTHRITIS TYPE: Primary | ICD-10-CM

## 2023-10-23 DIAGNOSIS — M25.561 RIGHT KNEE PAIN, UNSPECIFIED CHRONICITY: Primary | ICD-10-CM

## 2023-10-23 DIAGNOSIS — M17.11 OSTEOARTHRITIS OF RIGHT KNEE, UNSPECIFIED OSTEOARTHRITIS TYPE: ICD-10-CM

## 2023-10-23 RX ORDER — TRANEXAMIC ACID 10 MG/ML
1000 INJECTION, SOLUTION INTRAVENOUS ONCE
OUTPATIENT
Start: 2023-10-23 | End: 2023-10-23

## 2023-10-23 RX ORDER — CEFAZOLIN SODIUM IN 0.9 % NACL 3 G/100 ML
3 INTRAVENOUS SOLUTION, PIGGYBACK (ML) INTRAVENOUS ONCE
OUTPATIENT
Start: 2023-10-23 | End: 2023-10-23

## 2023-10-23 RX ORDER — CEFAZOLIN SODIUM 2 G/100ML
2 INJECTION, SOLUTION INTRAVENOUS ONCE
OUTPATIENT
Start: 2023-10-23 | End: 2023-10-23

## 2023-10-23 NOTE — PROGRESS NOTES
"Chief Complaint  Initial Evaluation of the Right Knee     Subjective      Ramon Holman presents to [facility] for initial evaluation of the right knee. He had a left total knee arthroplasty in the past.  He has had Visco injection in the right knee and notes that his knee is doing well.  His last injection was 6/26/23. He notes he has been going up and down ladders and working on the [place] roof.  He was wondering if he can hold off surgery if the injections are helping.     No Known Allergies     Social History     Socioeconomic History    Marital status:    Tobacco Use    Smoking status: Never    Smokeless tobacco: Never   Vaping Use    Vaping Use: Never used   Substance and Sexual Activity    Alcohol use: Not Currently    Drug use: Never    Sexual activity: Defer        I reviewed the patient's chief complaint, history of present illness, review of systems, past medical history, surgical history, family history, social history, medications, and allergy list.     Review of Systems     Constitutional: Denies fevers, chills, weight loss  Cardiovascular: Denies chest pain, shortness of breath  Skin: Denies rashes, acute skin changes  Neurologic: Denies headache, loss of consciousness        Vital Signs:   /91   Pulse 83   Ht 180.3 cm (71\")   Wt 85.3 kg (188 lb)   SpO2 96%   BMI 26.22 kg/m²          Physical Exam  General: Alert. No acute distress    Ortho Exam        RIGHT KNEE Flexion 115. Extension 0. Stable to varus/valgus stress. Stable to anterior/posterior drawer. Neurovascularly intact. Negative Vipul. Negative Lachman. Positive EHL, FHL, HS and TA. Sensation intact to light touch all 5 nerves of the foot. Ambulates with Non-antalgic gait. Patella is well tracking. Calf supple, non-tender. Negative tenderness to the medial joint line. Negative tenderness to the lateral joint line. Negative Crepitus. Good strength to hamstrings, quadriceps, dorsiflexors, and " plantar flexors.  Knee Extensor Mechanism intact        Procedures        Imaging Results (Most Recent)       Procedure Component Value Units Date/Time    XR Knee 3 View Right [[id number]] Resulted: 10/23/23 1404     Updated: 10/23/23 1405             Result Review :       X-Ray Report:  Right knee X-Ray  Indication: Evaluation of the right knee  AP/Lateral and [place] view(s)  Findings: Advanced degenerative bone on bone arthritis with osteophyte formation. .   Prior studies available for comparison: yes           Assessment and Plan     Diagnoses and all orders for this visit:    1. Right knee pain, unspecified chronicity (Primary)  -     XR Knee 3 View Right    2. Osteoarthritis of right knee, unspecified osteoarthritis type  -     XR Knee 3 View Right        Discussed the treatment plan with the patient. I reviewed the X-rays that were obtained today with the patient.     Discussed the treatment options with the patient, operative vs non-operative. The patient is a candidate when he is ready for a right total knee arthroplasty     He will call back with decision of continued conservative with Visco injections vs surgical intervention.     The patient expressed understanding and wished to proceed with a right total knee arthroplasty.     Discussed surgery., Risks/benefits discussed with patient including, but not limited to: infection, bleeding, neurovascular damage, re-rupture, aesthetic deformity, need for further surgery, and death., Discussed with patient the implant type being used during surgery and patient understands., Surgery pamphlet given., Call or return if worsening symptoms., and DME order for a 3 in 1 given today due to patient will be confined to one room/level of the home that does not offer a toilet during postop recovery.     Follow Up     2 weeks postoperatively     Patient was given instructions and counseling regarding his condition or for health maintenance advice. Please see specific  information pulled into the AVS if appropriate.     Scribed for Vladimir Vanegas MD by Tanya Casillas MA.  10/23/23   13:41 EDT    I have personally performed the services described in this document as scribed by the above individual and it is both accurate and complete. Vladimir Vanegas MD 10/24/23

## 2023-10-24 PROBLEM — M17.11 OSTEOARTHRITIS OF RIGHT KNEE: Status: ACTIVE | Noted: 2023-10-23

## 2023-10-25 ENCOUNTER — TELEPHONE (OUTPATIENT)
Dept: ORTHOPEDIC SURGERY | Facility: CLINIC | Age: 68
End: 2023-10-25
Payer: MEDICARE

## 2023-10-25 NOTE — TELEPHONE ENCOUNTER
PATIENT WAS CALLED AND MESSAGE WAS LEFT THAT PER DR RIVERA PATIENT COULD HOLD/STOP ASPIRIN FOR 7 DAYS PRIOR TO SURGERY.

## 2023-12-05 ENCOUNTER — TELEPHONE (OUTPATIENT)
Dept: ORTHOPEDIC SURGERY | Facility: CLINIC | Age: 68
End: 2023-12-05

## 2023-12-05 ENCOUNTER — TELEPHONE (OUTPATIENT)
Dept: ORTHOPEDIC SURGERY | Facility: CLINIC | Age: 68
End: 2023-12-05
Payer: MEDICARE

## 2023-12-05 NOTE — TELEPHONE ENCOUNTER
Provider: BEEN    Caller: NICKI    Relationship to Patient: SELF    Pharmacy:     Phone Number: [phone]    Reason for Call: PT WOULD LIKE TO START PROCESS FOR EUFLEXXA INJS

## 2023-12-05 NOTE — TELEPHONE ENCOUNTER
PATIENT CALLED IN AND WISHES TO CANCEL SURGERY FOR 1/9/2024. PER PATIENT THE GEL INJECTIONS WORKED LAST TIME AND HE WISHES TO TRY ANOTHER INJECTION FIRST BEFORE PROCEEDING WITH SURGERY.     SURGERY FOR 1/9/2024 WAS CANCELED PER PATIENT'S REQUEST.

## 2023-12-13 NOTE — TELEPHONE ENCOUNTER
[facility] TO LET PATIENT KNOW THAT WE WE'RE NOT GETTING AUTH FOR INJECTIONS TILL AFTER THE FIRST OF THE YEAR.PATIENT IS AWARE

## 2023-12-13 NOTE — TELEPHONE ENCOUNTER
PATIENT CALLED TO CHECK STATUS OF AUTH FOR GEL INJECTIONS - SEE 12.5.23 TE    WOULD LIKE A CALLBACK ASAP

## 2024-01-08 ENCOUNTER — TELEPHONE (OUTPATIENT)
Dept: ORTHOPEDIC SURGERY | Facility: CLINIC | Age: 69
End: 2024-01-08
Payer: MEDICARE

## 2024-01-08 NOTE — TELEPHONE ENCOUNTER
Caller: NICKI AGUIAR    Relationship to patient: SELF    Best call back number: [phone] (CALL HOME PH# AND CAN LEAVE [facility] IF NEEDED)    Chief complaint:  PATIENT CALLING ABOUT STATUS OF RIGHT KNEE GEL INJECTIONS. PLEASE SEE 2 TE'S ON 12/5/23. PATIENT SAYS NO ONE TOLD HIM PRIOR AUTH NOT UNTIL AFTER JANUARY.     Type of visit: GEL INJECTIONS

## 2024-01-11 ENCOUNTER — TELEPHONE (OUTPATIENT)
Dept: ORTHOPEDIC SURGERY | Facility: CLINIC | Age: 69
End: 2024-01-11
Payer: MEDICARE

## 2024-01-11 NOTE — TELEPHONE ENCOUNTER
APPT: 1-29 AT 3:30, 2-5 AT 3:30 AND 2-12 AT 3:30 PM RT KNEE EUFLEXXA INJ WITH [facility].    LAST RT KNEE EUFLEXXA INJ: 6-26    ENTERED DANA KIM AUTH #

## 2024-01-11 NOTE — TELEPHONE ENCOUNTER
----- Message from Sushila Ibrahim sent at 1/9/2024  3:23 PM EST -----  Patient has been approved for Right Knee Euflexxa for dates:  01/08/24 to 07/06/24.  Okay to schedule when appropriate.  Auth #:  [id number]

## 2024-01-15 ENCOUNTER — OFFICE VISIT (OUTPATIENT)
Dept: ORTHOPEDIC SURGERY | Facility: CLINIC | Age: 69
End: 2024-01-15
Payer: MEDICARE

## 2024-01-15 VITALS — DIASTOLIC BLOOD PRESSURE: 83 MMHG | SYSTOLIC BLOOD PRESSURE: 133 MMHG | HEART RATE: 84 BPM | OXYGEN SATURATION: 97 %

## 2024-01-15 DIAGNOSIS — Z96.652 HISTORY OF TOTAL KNEE ARTHROPLASTY, LEFT: ICD-10-CM

## 2024-01-15 DIAGNOSIS — M25.562 LEFT KNEE PAIN, UNSPECIFIED CHRONICITY: Primary | ICD-10-CM

## 2024-01-15 PROCEDURE — 1160F RVW MEDS BY RX/DR IN RCRD: CPT | Performed by: PHYSICIAN ASSISTANT

## 2024-01-15 PROCEDURE — 99213 OFFICE O/P EST LOW 20 MIN: CPT | Performed by: PHYSICIAN ASSISTANT

## 2024-01-15 PROCEDURE — 1159F MED LIST DOCD IN RCRD: CPT | Performed by: PHYSICIAN ASSISTANT

## 2024-01-15 NOTE — PROGRESS NOTES
"Chief Complaint  Follow-up of the Left Knee    Subjective      Ramon Holman presents to [facility] for follow up of left knee. He has a history of left total knee arthroplasty with Trena robot performed on 1/10/23 by Dr. Vanegas. He presents independently ambulatory without use of assistive device. He reports that his knee is \"doing all right\".  He does report \"awkward kneeling\" and residual numbness to the lateral aspect of his knee. He has been able to return to baseline activities/ADLs.     Objective   No Known Allergies    Vital  Signs:   /83   Pulse 84   SpO2 97%       Physical Exam    Constitutional: Awake, alert. Well nourished appearance.    Integumentary: Warm, dry, intact. No obvious rashes.    HENT: Atraumatic, normocephalic.   Respiratory: Non labored respirations .   Cardiovascular: Intact peripheral pulses.    Psychiatric: Normal mood and affect. A&O X3    Ortho Exam  Left knee: Well-healed surgical scar noted.  No edema.  Patella is well tracking and knee is stable to varus and valgus stress.  -3 degrees of extension and flexion to 120 degrees.  Full plantarflexion and dorsiflexion of the ankle.  Sensation and distal neurovascular intact.  Smooth sit to stand transition.    Imaging Results (Most Recent)       Procedure Component Value Units Date/Time    XR Knee 3 View Left [[id number]] Resulted: 01/15/24 1101     Updated: 01/15/24 1101    Narrative:      X-Ray Report:  Study: X-rays ordered, taken in the office, and reviewed today.   Site: Left knee Xray  Indication: TKA  View: AP/Lateral, Standing, and [place] view(s)  Findings: Intact left total knee arthroplasty. No evidence of hardware   malfunction.   Prior studies available for comparison: yes                       Assessment and Plan   Problem List Items Addressed This Visit    None  Visit Diagnoses       Left knee pain, unspecified chronicity    -  Primary    Relevant Orders    XR Knee 3 View Left " (Completed)    History of total knee arthroplasty, left                Follow Up   Return in about 1 year (around 1/15/2025).    Tobacco Use: Low Risk  (1/15/2024)    Patient History     Smoking Tobacco Use: Never     Smokeless Tobacco Use: Never     Passive Exposure: Not on file     Patient is a non-smoker.  Did not discuss tobacco cessation options.    Patient Instructions   X-rays reviewed, showing hardware intact. Continue home exercise program to progress strength and ROM.    Continue with lifelong antibiotic prophylaxis with dental procedures following total joint replacement.    Follow-up in 1 year. Call sooner or return to care, if needed with any changes or concerns. Repeat x-rays.     Patient was given instructions and counseling regarding his condition or for health maintenance advice. Please see specific information pulled into the AVS if appropriate.

## 2024-01-29 ENCOUNTER — OFFICE VISIT (OUTPATIENT)
Dept: ORTHOPEDIC SURGERY | Facility: CLINIC | Age: 69
End: 2024-01-29
Payer: MEDICARE

## 2024-01-29 VITALS
HEIGHT: 71 IN | HEART RATE: 85 BPM | OXYGEN SATURATION: 96 % | SYSTOLIC BLOOD PRESSURE: 137 MMHG | BODY MASS INDEX: 26.32 KG/M2 | WEIGHT: 188 LBS | DIASTOLIC BLOOD PRESSURE: 89 MMHG

## 2024-01-29 DIAGNOSIS — M17.11 OSTEOARTHRITIS OF RIGHT KNEE, UNSPECIFIED OSTEOARTHRITIS TYPE: Primary | ICD-10-CM

## 2024-01-29 PROCEDURE — 1159F MED LIST DOCD IN RCRD: CPT | Performed by: PHYSICIAN ASSISTANT

## 2024-01-29 PROCEDURE — 1160F RVW MEDS BY RX/DR IN RCRD: CPT | Performed by: PHYSICIAN ASSISTANT

## 2024-01-29 PROCEDURE — 20610 DRAIN/INJ JOINT/BURSA W/O US: CPT | Performed by: PHYSICIAN ASSISTANT

## 2024-01-29 RX ORDER — HYALURONATE SODIUM 10 MG/ML
20 SYRINGE (ML) INTRAARTICULAR
Status: COMPLETED | OUTPATIENT
Start: 2024-01-29 | End: 2024-01-29

## 2024-01-29 RX ADMIN — Medication 20 MG: at 15:46

## 2024-01-29 NOTE — PROGRESS NOTES
"Chief Complaint  Follow-up and Pain of the Right Knee    Subjective      Ramon Holman presents to [facility] for follow-up of right knee pain and osteoarthritis.  He has managed this conservatively with intermittent injections in the past.  He presents today with insurance authorization for initiation of right knee Euflexxa injection series, requesting to proceed with this in office today.    Objective   No Known Allergies    Vital Signs:   /89   Pulse 85   Ht 180.3 cm (71\")   Wt 85.3 kg (188 lb)   SpO2 96%   BMI 26.22 kg/m²       Physical Exam    Constitutional: Awake, alert. Well nourished appearance.    Integumentary: Warm, dry, intact. No obvious rashes.    HENT: Atraumatic, normocephalic.   Respiratory: Non labored respirations .   Cardiovascular: Intact peripheral pulses.    Psychiatric: Normal mood and affect. A&O X3    Ortho Exam  Right knee: Skin is warm, dry, and intact.  Mild edema.  Patella is well tracking and knee is stable to varus and valgus stress.  Full knee extension and flexion to 115 degrees.  Full plantarflexion and dorsiflexion of the ankle.  Sensation and distal neurovascular intact.  Smooth sit to stand transition.  Patient fully weightbearing with nonantalgic gait.    Imaging Results (Most Recent)       None             Large Joint Arthrocentesis: R knee  Date/Time: 1/29/2024 3:46 PM  Consent given by: patient  Site marked: site marked  Timeout: Immediately prior to procedure a time out was called to verify the correct patient, procedure, equipment, support staff and site/side marked as required   Supporting Documentation  Indications: pain   Procedure Details  Location: knee - R knee  Needle gauge: 21g.  Medications administered: 20 mg Sodium Hyaluronate (Viscosup) 20 MG/2ML  Patient tolerance: patient tolerated the procedure well with no immediate complications              Assessment and Plan   Problem List Items Addressed This Visit          " Musculoskeletal and Injuries    Osteoarthritis of right knee - Primary       Follow Up   Return in about 1 week (around 2/5/2024).    Tobacco Use: Low Risk  (1/29/2024)    Patient History     Smoking Tobacco Use: Never     Smokeless Tobacco Use: Never     Passive Exposure: Not on file     Patient is a non-smoker.  Did not discuss tobacco cessation options.    Patient Instructions   Right knee Euflexxa injection #1 administered in office today.  Follow-up in 1 week for second injection.  Call with changes or concerns.  Patient was given instructions and counseling regarding his condition or for health maintenance advice. Please see specific information pulled into the AVS if appropriate.

## 2024-01-29 NOTE — PATIENT INSTRUCTIONS
Right knee Euflexxa injection #1 administered in office today.  Follow-up in 1 week for second injection.  Call with changes or concerns.

## 2024-02-05 ENCOUNTER — OFFICE VISIT (OUTPATIENT)
Dept: ORTHOPEDIC SURGERY | Facility: CLINIC | Age: 69
End: 2024-02-05
Payer: MEDICARE

## 2024-02-05 VITALS — WEIGHT: 188 LBS | HEIGHT: 71 IN | BODY MASS INDEX: 26.32 KG/M2

## 2024-02-05 DIAGNOSIS — M17.11 OSTEOARTHRITIS OF RIGHT KNEE, UNSPECIFIED OSTEOARTHRITIS TYPE: Primary | ICD-10-CM

## 2024-02-05 PROCEDURE — 1159F MED LIST DOCD IN RCRD: CPT | Performed by: PHYSICIAN ASSISTANT

## 2024-02-05 PROCEDURE — 1160F RVW MEDS BY RX/DR IN RCRD: CPT | Performed by: PHYSICIAN ASSISTANT

## 2024-02-05 PROCEDURE — 20610 DRAIN/INJ JOINT/BURSA W/O US: CPT | Performed by: PHYSICIAN ASSISTANT

## 2024-02-05 RX ORDER — HYALURONATE SODIUM 10 MG/ML
20 SYRINGE (ML) INTRAARTICULAR
Status: COMPLETED | OUTPATIENT
Start: 2024-02-05 | End: 2024-02-05

## 2024-02-05 RX ADMIN — Medication 20 MG: at 15:30

## 2024-02-05 NOTE — PROGRESS NOTES
"Chief Complaint  Follow-up of the Right Knee    Subjective      Ramon Holman presents to [facility] for follow-up of right knee pain and osteoarthritis.  Patient is manages conservatively with intermittent injections in the past.  He was last seen in office on 1/29/2024 for right knee Euflexxa injection #1.  He presents today independently ambulatory without use of assistive device.  He has noted minimal change in his right knee.  He is requesting to proceed with right knee Euflexxa injection #2 in office today.    Objective   No Known Allergies    Vital Signs:   Ht 180.3 cm (71\")   Wt 85.3 kg (188 lb)   BMI 26.22 kg/m²       Physical Exam    Constitutional: Awake, alert. Well nourished appearance.    Integumentary: Warm, dry, intact. No obvious rashes.    HENT: Atraumatic, normocephalic.   Respiratory: Non labored respirations .   Cardiovascular: Intact peripheral pulses.    Psychiatric: Normal mood and affect. A&O X3    Ortho Exam  Right knee: Skin is warm, dry, and intact.  Mild edema.  Patella is well tracking and knee stable varus valgus stress.  Full knee extension and flexion to 115 degrees.  Full plantarflexion and dorsiflexion of the ankle.  Sensation and distal neurovascular intact.  Smooth sit to stand transition.  Patient fully weightbearing with nonantalgic gait.    Imaging Results (Most Recent)       None             Right knee: R knee  Date/Time: 2/5/2024 3:30 PM  Consent given by: patient  Site marked: site marked  Timeout: Immediately prior to procedure a time out was called to verify the correct patient, procedure, equipment, support staff and site/side marked as required   Supporting Documentation  Indications: pain   Procedure Details  Location: knee - R knee  Needle gauge: 21G.  Medications administered: 20 mg Sodium Hyaluronate (Viscosup) 20 MG/2ML  Patient tolerance: patient tolerated the procedure well with no immediate complications              Assessment " and Plan   Problem List Items Addressed This Visit          Musculoskeletal and Injuries    Osteoarthritis of right knee - Primary    Relevant Orders    Right knee: R knee       Follow Up   Return in about 1 week (around 2/12/2024).    Tobacco Use: Low Risk  (2/5/2024)    Patient History     Smoking Tobacco Use: Never     Smokeless Tobacco Use: Never     Passive Exposure: Not on file     Patient is a non-smoker.  Did not discuss tobacco cessation options.    Patient Instructions   Right knee Euflexxa injection #2 administered in office today.  Follow-up in 1 week for completion of injection series.  Call with changes or concerns.  Patient was given instructions and counseling regarding his condition or for health maintenance advice. Please see specific information pulled into the AVS if appropriate.

## 2024-02-05 NOTE — PATIENT INSTRUCTIONS
Right knee Euflexxa injection #2 administered in office today.  Follow-up in 1 week for completion of injection series.  Call with changes or concerns.

## 2024-02-12 ENCOUNTER — OFFICE VISIT (OUTPATIENT)
Dept: ORTHOPEDIC SURGERY | Facility: CLINIC | Age: 69
End: 2024-02-12
Payer: MEDICARE

## 2024-02-12 VITALS — WEIGHT: 188 LBS | HEIGHT: 71 IN | BODY MASS INDEX: 26.32 KG/M2

## 2024-02-12 DIAGNOSIS — M17.11 OSTEOARTHRITIS OF RIGHT KNEE, UNSPECIFIED OSTEOARTHRITIS TYPE: Primary | ICD-10-CM

## 2024-02-12 PROCEDURE — 20610 DRAIN/INJ JOINT/BURSA W/O US: CPT | Performed by: PHYSICIAN ASSISTANT

## 2024-02-12 PROCEDURE — 1160F RVW MEDS BY RX/DR IN RCRD: CPT | Performed by: PHYSICIAN ASSISTANT

## 2024-02-12 PROCEDURE — 1159F MED LIST DOCD IN RCRD: CPT | Performed by: PHYSICIAN ASSISTANT

## 2024-02-12 RX ORDER — HYALURONATE SODIUM 10 MG/ML
20 SYRINGE (ML) INTRAARTICULAR
Status: COMPLETED | OUTPATIENT
Start: 2024-02-12 | End: 2024-02-12

## 2024-02-12 RX ADMIN — Medication 20 MG: at 15:21

## 2024-04-04 ENCOUNTER — TELEPHONE (OUTPATIENT)
Dept: ORTHOPEDIC SURGERY | Facility: CLINIC | Age: 69
End: 2024-04-04
Payer: MEDICARE

## 2024-04-04 NOTE — TELEPHONE ENCOUNTER
CONFIRMED W/PATIENT THAT [facility] LEAVING PRACTICE 06.27.24 AND THAT THEY WILL BE SEEING LYNN LAMA INSTEAD - PATIENT EXPRESSED UNDERSTANDING

## 2024-05-14 ENCOUNTER — TELEPHONE (OUTPATIENT)
Dept: ORTHOPEDIC SURGERY | Facility: CLINIC | Age: 69
End: 2024-05-14
Payer: MEDICARE

## 2024-05-14 NOTE — TELEPHONE ENCOUNTER
Caller: Ramon Holman    Relationship to patient: Self    Best call back number: [phone]    Chief complaint: RIGHT KNEE     Type of visit: EUFLEXXA     Requested date: ASAP      Additional notes:LAST INJECTIONS 02/2024

## 2024-07-02 ENCOUNTER — TELEPHONE (OUTPATIENT)
Dept: ORTHOPEDIC SURGERY | Facility: CLINIC | Age: 69
End: 2024-07-02

## 2024-07-02 NOTE — TELEPHONE ENCOUNTER
Provider: BEEN    Caller: NICKI    Relationship to Patient: SELF    Pharmacy:     Phone Number: [phone]    Reason for Call: PT CALLING TO START PROCESS FOR GEL INJS FOR RIGHT KNEE -

## 2024-07-15 ENCOUNTER — TELEPHONE (OUTPATIENT)
Dept: ORTHOPEDIC SURGERY | Facility: CLINIC | Age: 69
End: 2024-07-15
Payer: MEDICARE

## 2024-07-15 NOTE — TELEPHONE ENCOUNTER
----- Message from Sushila MOISE sent at 7/11/2024 11:23 AM EDT -----  Patient has been approved for Right Knee Euflexxa for dates:  08/13/24 to 02/09/25.  Okay to schedule when appropriate.  Auth #:  [id number]

## 2024-07-15 NOTE — TELEPHONE ENCOUNTER
CALLED AND LVM TO THE PT REGARDING HIS APPTS (7-15-24)  RT KNEE, EUFLEXXA INJ WITH [facility]    9-11 AT 2:15 PM  9-15 AT 2:15 PM  9-25 AT 2:15 PM    LAST RT KNEE EUFLEXXA INJ: 2-12-24    [employer]

## 2024-07-15 NOTE — TELEPHONE ENCOUNTER
Caller: NICKI    Relationship: SELF    Best call back number: [phone]    What is the best time to reach you: ANY- LEAVE MSG IF UNAVAILABLE    What was the call regarding: GEL INJECTIONS APPTS REQUESTED FOR AUGUST NOT SEPTEMBER- HE HAS VACATION IN SEPTEMBER- HIS LAST INJECTIONS WERE 2/12/24- WANTS PRIOR TO VACATION SO HE ISN'T IN PAIN ON VACATION    Is it okay if the provider responds through MyChart: CALL

## 2024-08-16 ENCOUNTER — OFFICE VISIT (OUTPATIENT)
Dept: ORTHOPEDIC SURGERY | Facility: CLINIC | Age: 69
End: 2024-08-16
Payer: MEDICARE

## 2024-08-16 VITALS
HEIGHT: 71 IN | OXYGEN SATURATION: 96 % | HEART RATE: 77 BPM | SYSTOLIC BLOOD PRESSURE: 137 MMHG | BODY MASS INDEX: 26.33 KG/M2 | DIASTOLIC BLOOD PRESSURE: 84 MMHG | WEIGHT: 188.05 LBS

## 2024-08-16 DIAGNOSIS — M17.11 OSTEOARTHRITIS OF RIGHT KNEE, UNSPECIFIED OSTEOARTHRITIS TYPE: Primary | ICD-10-CM

## 2024-08-16 DIAGNOSIS — M25.561 RIGHT KNEE PAIN, UNSPECIFIED CHRONICITY: ICD-10-CM

## 2024-08-16 RX ORDER — HYALURONATE SODIUM 10 MG/ML
20 SYRINGE (ML) INTRAARTICULAR
Status: COMPLETED | OUTPATIENT
Start: 2024-08-16 | End: 2024-08-16

## 2024-08-16 RX ADMIN — Medication 20 MG: at 15:22

## 2024-08-16 NOTE — PROGRESS NOTES
"Chief Complaint  Pain and Follow-up of the Right Knee    Subjective      Ramno Holman presents to [facility] for follow up of his right knee.  Patient has right knee pain osteoarthritis that we have managed conservatively with intermittent injections.  Patient was last seen in office on 2/12/2024 and finished with his right knee Euflexxa injection series..  Patient returns to clinic today with prior authorization approval to begin Euflexxa injection series again for his right knee.  He denies any falls or injuries since his last office visit.    No Known Allergies    Objective     Vital Signs:   Vitals:    08/16/24 1521   BP: 137/84   Pulse: 77   SpO2: 96%   Weight: 85.3 kg (188 lb 0.8 oz)   Height: 180.3 cm (71\")     Body mass index is 26.23 kg/m².    I reviewed the patient's chief complaint, history of present illness, review of systems, past medical history, surgical history, family history, social history, medications, and allergy list.     REVIEW OF SYSTEMS    Constitutional: Denies fevers, chills, weight loss  Cardiovascular: Denies chest pain, shortness of breath  Skin: Denies rashes, acute skin changes  Neurologic: Denies headache, loss of consciousness  MSK: Right knee pain.     Ortho Exam  Knee   General: Alert, no acute distress.   Right knee: No pain with passive hip ROM. Knee stable to varus/valgus stress.  Knee extensor mechanism intact.  0 degrees knee extension. Flexion to 125 degrees. Calf soft, non-tender.  Sensation and neurovascularly intact.  Demonstrates active ankle dorsiflexion and plantarflexion.  Palpable pedal pulses.         Large Joint Arthrocentesis: R knee  Date/Time: 8/16/2024 3:22 PM  Consent given by: patient  Site marked: site marked  Timeout: Immediately prior to procedure a time out was called to verify the correct patient, procedure, equipment, support staff and site/side marked as required   Supporting Documentation  Indications: pain "   Procedure Details  Location: knee - R knee  Preparation: Patient was prepped and draped in the usual sterile fashion  Needle gauge: 21 G.  Approach: lateral  Medications administered: 20 mg Sodium Hyaluronate (Viscosup) 20 MG/2ML  Patient tolerance: patient tolerated the procedure well with no immediate complications         This injection documentation was Scribed for DULCE Gonzalez by Janelle Bowman.  08/16/24   15:22 EDT    Imaging Results (Most Recent)       None                Assessment and Plan   Diagnoses and all orders for this visit:    1. Osteoarthritis of right knee, unspecified osteoarthritis type (Primary)    2. Right knee pain, unspecified chronicity    Other orders  -     Large Joint Arthrocentesis: R knee         Ramon Holman presents today to [facility] for the follow up of their right knee. They have right knee pain and osteoarthritis that we have been treating conservatively with intermittent injections.     We discussed the risks and benefits with the patient regarding right knee gel injection. Patient understood and elected to proceed.  Right knee Euflexxa injection #1 given in office today. Patient tolerated injection well with no complications.     Patient will return in 1 week for right knee Euflexxa injection #2.       Follow Up   Return in about 1 week (around 8/23/2024).  There are no Patient Instructions on file for this visit.  Patient was given instructions and counseling regarding his condition or for health maintenance advice. Please see specific information pulled into the AVS if appropriate.       Dictated Utilizing Dragon Dictation. Please note that portions of this note were completed with a voice recognition program. Part of this note may be an electronic transcription/translation of spoken language to printed text using the Dragon Dictation System.

## 2024-08-21 ENCOUNTER — OFFICE VISIT (OUTPATIENT)
Dept: ORTHOPEDIC SURGERY | Facility: CLINIC | Age: 69
End: 2024-08-21
Payer: MEDICARE

## 2024-08-21 VITALS
OXYGEN SATURATION: 96 % | BODY MASS INDEX: 26.32 KG/M2 | WEIGHT: 188 LBS | HEIGHT: 71 IN | SYSTOLIC BLOOD PRESSURE: 148 MMHG | DIASTOLIC BLOOD PRESSURE: 74 MMHG | HEART RATE: 71 BPM

## 2024-08-21 DIAGNOSIS — M25.561 RIGHT KNEE PAIN, UNSPECIFIED CHRONICITY: ICD-10-CM

## 2024-08-21 DIAGNOSIS — M17.11 OSTEOARTHRITIS OF RIGHT KNEE, UNSPECIFIED OSTEOARTHRITIS TYPE: Primary | ICD-10-CM

## 2024-08-21 RX ORDER — HYALURONATE SODIUM 10 MG/ML
20 SYRINGE (ML) INTRAARTICULAR
Status: COMPLETED | OUTPATIENT
Start: 2024-08-21 | End: 2024-08-21

## 2024-08-21 RX ADMIN — Medication 20 MG: at 12:05

## 2024-08-21 NOTE — PROGRESS NOTES
"Chief Complaint  Follow-up of the Right Knee    Subjective      Ramon Holman presents to [facility] for follow up of his right knee.  Patient has right knee pain osteoarthritis that we have managed conservatively with intermittent injections.  Patient last seen in office on 8/16/2024 and received his right knee Euflexxa injection #1.  He tolerated that well.  He returns to clinic today to receive right knee Euflexxa injection #2.    No Known Allergies    Objective     Vital Signs:   Vitals:    08/21/24 1125   BP: 148/74   Pulse: 71   SpO2: 96%   Weight: 85.3 kg (188 lb)   Height: 180.3 cm (70.98\")     Body mass index is 26.23 kg/m².    I reviewed the patient's chief complaint, history of present illness, review of systems, past medical history, surgical history, family history, social history, medications, and allergy list.     REVIEW OF SYSTEMS    Constitutional: Denies fevers, chills, weight loss  Cardiovascular: Denies chest pain, shortness of breath  Skin: Denies rashes, acute skin changes  Neurologic: Denies headache, loss of consciousness  MSK: Right knee pain.     Ortho Exam  Knee   General: Alert, no acute distress.   Right knee: No pain with passive hip ROM. Knee stable to varus/valgus stress.  Knee extensor mechanism intact.  Right knee extension. Flexion to 125 degrees. Calf soft, non-tender.  Sensation and neurovascularly intact.  Demonstrates active ankle dorsiflexion and plantarflexion.  Palpable pedal pulses.         Large Joint: R knee  Date/Time: 8/21/2024 12:05 PM  Consent given by: patient  Site marked: site marked  Timeout: Immediately prior to procedure a time out was called to verify the correct patient, procedure, equipment, support staff and site/side marked as required   Supporting Documentation  Indications: pain   Procedure Details  Location: knee - R knee  Preparation: Patient was prepped and draped in the usual sterile fashion  Needle gauge: " 21g.  Medications administered: 20 mg Sodium Hyaluronate (Viscosup) 20 MG/2ML  Patient tolerance: patient tolerated the procedure well with no immediate complications      This injection documentation was Scribed for DULCE Gonzalez by Stacie Harper MA.  08/21/24   12:06 EDT       Imaging Results (Most Recent)       None                Assessment and Plan   Diagnoses and all orders for this visit:    1. Osteoarthritis of right knee, unspecified osteoarthritis type (Primary)    2. Right knee pain, unspecified chronicity    Other orders  -     Large Joint: R knee       Ramon Holman presents today to [facility] for the follow up of their right knee. They have right knee pain and osteoarthritis that we have been treating conservatively with intermittent injections.      We discussed the risks and benefits with the patient regarding right knee gel injection. Patient understood and elected to proceed.  Right knee Euflexxa injection #2 given in office today. Patient tolerated injection well with no complications.      Patient will return in 1 week for right knee Euflexxa injection #3.    Tobacco Use: Low Risk  (8/21/2024)    Patient History     Smoking Tobacco Use: Never     Smokeless Tobacco Use: Never     Passive Exposure: Not on file     Patient reports that they are a nonsmoker; cessation education not applicable.     Follow Up   Return in about 1 week (around 8/28/2024).  There are no Patient Instructions on file for this visit.  Patient was given instructions and counseling regarding his condition or for health maintenance advice. Please see specific information pulled into the AVS if appropriate.       Dictated Utilizing Dragon Dictation. Please note that portions of this note were completed with a voice recognition program. Part of this note may be an electronic transcription/translation of spoken language to printed text using the Dragon Dictation System.

## 2024-08-28 ENCOUNTER — OFFICE VISIT (OUTPATIENT)
Dept: ORTHOPEDIC SURGERY | Facility: CLINIC | Age: 69
End: 2024-08-28
Payer: MEDICARE

## 2024-08-28 VITALS
HEIGHT: 71 IN | OXYGEN SATURATION: 96 % | DIASTOLIC BLOOD PRESSURE: 87 MMHG | HEART RATE: 62 BPM | BODY MASS INDEX: 26.32 KG/M2 | WEIGHT: 188 LBS | SYSTOLIC BLOOD PRESSURE: 130 MMHG

## 2024-08-28 DIAGNOSIS — M17.11 OSTEOARTHRITIS OF RIGHT KNEE, UNSPECIFIED OSTEOARTHRITIS TYPE: Primary | ICD-10-CM

## 2024-08-28 DIAGNOSIS — M25.561 RIGHT KNEE PAIN, UNSPECIFIED CHRONICITY: ICD-10-CM

## 2024-08-28 PROCEDURE — 20610 DRAIN/INJ JOINT/BURSA W/O US: CPT

## 2024-08-28 RX ADMIN — Medication 20 MG: at 11:07

## 2024-08-28 NOTE — PROGRESS NOTES
"Chief Complaint  Follow-up of the Right Knee    Subjective      Ramon Holman presents to [facility] for follow up of his right knee.  Patient last seen in office on 8/21/2024 and received his right knee Euflexxa injection #2. He tolerated that well. He returns to clinic today to receive right knee Euflexxa injection #3.     No Known Allergies    Objective     Vital Signs:   Vitals:    08/28/24 1102   BP: 130/87   Pulse: 62   SpO2: 96%   Weight: 85.3 kg (188 lb)   Height: 180.3 cm (70.98\")     Body mass index is 26.23 kg/m².    I reviewed the patient's chief complaint, history of present illness, review of systems, past medical history, surgical history, family history, social history, medications, and allergy list.     REVIEW OF SYSTEMS    Constitutional: Denies fevers, chills, weight loss  Cardiovascular: Denies chest pain, shortness of breath  Skin: Denies rashes, acute skin changes  Neurologic: Denies headache, loss of consciousness  MSK: Right knee pain .     Ortho Exam  Knee   General: Alert, no acute distress.   right Knee: No pain with passive hip ROM.  Knee stable to varus/valgus stress.  Knee extensor mechanism intact. 0 degrees knee extension. Flexion to 125 degrees. Calf soft, non-tender.  Sensation and neurovascularly intact.  Demonstrates active ankle dorsiflexion and plantarflexion.  Palpable pedal pulses.         Large Joint: R knee  Date/Time: 8/28/2024 11:07 AM  Consent given by: patient  Site marked: site marked  Timeout: Immediately prior to procedure a time out was called to verify the correct patient, procedure, equipment, support staff and site/side marked as required   Supporting Documentation  Indications: pain   Procedure Details  Location: knee - R knee  Preparation: Patient was prepped and draped in the usual sterile fashion  Needle gauge: 21g.  Medications administered: 20 mg Sodium Hyaluronate (Viscosup) 20 MG/2ML  Patient tolerance: patient tolerated the " procedure well with no immediate complications       This injection documentation was Scribed for DULCE Gonzalez by Stacie Harper MA.  08/28/24   11:07 EDT      Imaging Results (Most Recent)       None                Assessment and Plan   Diagnoses and all orders for this visit:    1. Osteoarthritis of right knee, unspecified osteoarthritis type (Primary)  -     Large Joint: R knee    2. Right knee pain, unspecified chronicity  -     Large Joint: R knee       Ramon Holman presents today to [facility] for the follow up of their right knee. They have right knee pain and osteoarthritis that we have been treating conservatively with intermittent injections.      We discussed the risks and benefits with the patient regarding right knee gel injection. Patient understood and elected to proceed.  Right knee Euflexxa injection #3 given in office today. Patient tolerated injection well with no complications.      Patient is eligible for right knee steroid injection in 6 weeks if needed.  Patient is eligible for repeat viscosupplementation in 6 months.  Patient was advised to contact the office approximately 1 month prior to eligibility for viscosupplementation for assessment for prior authorization approval.    Patient will follow-up as needed.    Tobacco Use: Low Risk  (8/28/2024)    Patient History     Smoking Tobacco Use: Never     Smokeless Tobacco Use: Never     Passive Exposure: Not on file     Patient reports that they are a nonsmoker; cessation education not applicable.            Follow Up   No follow-ups on file.  There are no Patient Instructions on file for this visit.  Patient was given instructions and counseling regarding his condition or for health maintenance advice. Please see specific information pulled into the AVS if appropriate.       Dictated Utilizing Dragon Dictation. Please note that portions of this note were completed with a voice recognition program. Part of this note may be an  electronic transcription/translation of spoken language to printed text using the Dragon Dictation System.

## 2024-09-03 RX ORDER — HYALURONATE SODIUM 10 MG/ML
20 SYRINGE (ML) INTRAARTICULAR
Status: COMPLETED | OUTPATIENT
Start: 2024-08-28 | End: 2024-08-28

## 2025-01-13 ENCOUNTER — OFFICE VISIT (OUTPATIENT)
Dept: ORTHOPEDIC SURGERY | Facility: CLINIC | Age: 70
End: 2025-01-13
Payer: MEDICARE

## 2025-01-13 VITALS
HEIGHT: 71 IN | OXYGEN SATURATION: 95 % | HEART RATE: 75 BPM | BODY MASS INDEX: 26.32 KG/M2 | DIASTOLIC BLOOD PRESSURE: 88 MMHG | SYSTOLIC BLOOD PRESSURE: 156 MMHG | WEIGHT: 188 LBS

## 2025-01-13 DIAGNOSIS — M25.562 LEFT KNEE PAIN, UNSPECIFIED CHRONICITY: ICD-10-CM

## 2025-01-13 DIAGNOSIS — M17.11 OSTEOARTHRITIS OF RIGHT KNEE, UNSPECIFIED OSTEOARTHRITIS TYPE: ICD-10-CM

## 2025-01-13 DIAGNOSIS — M25.561 RIGHT KNEE PAIN, UNSPECIFIED CHRONICITY: ICD-10-CM

## 2025-01-13 DIAGNOSIS — Z96.652 HISTORY OF TOTAL KNEE ARTHROPLASTY, LEFT: Primary | ICD-10-CM

## 2025-01-13 PROCEDURE — 99213 OFFICE O/P EST LOW 20 MIN: CPT

## 2025-01-13 NOTE — PROGRESS NOTES
"Chief Complaint  Follow-up of the Left Knee    Subjective      Ramon Holman presents to [facility] for follow up of his left knee.  They are 2 year(s) postop left total knee arthroplasty performed by Dr. Vanegas on 1/10/2023.  He arrives independently amatory without the use of any assist devices.  He overall he states he is doing very well.  He states that his left knee is doing well and occasionally has some stiffness on the knee but denies any falls or injuries.    Patient also has right knee pain osteoarthritis.  He is not interested in surgical intervention and is requesting repeat viscosupplementation.     No Known Allergies    Objective     Vital Signs:   Vitals:    01/13/25 1122   BP: 156/88   Pulse: 75   SpO2: 95%   Weight: 85.3 kg (188 lb)   Height: 180.3 cm (70.98\")     Body mass index is 26.23 kg/m².    I reviewed the patient's chief complaint, history of present illness, review of systems, past medical history, surgical history, family history, social history, medications, and allergy list.     Ortho Exam  Knee   General: Alert, no acute distress.   Left knee: Incision well-healed.  Knee stable to varus/valgus stress.  Knee extensor mechanism intact.  0 degrees knee extension. Flexion to 120 degrees. Calf soft, non-tender.  Sensation and neurovascularly intact.  Demonstrates active ankle dorsiflexion and plantarflexion.  Palpable pedal pulses.               Imaging Results (Most Recent)       Procedure Component Value Units Date/Time    XR Knee 3 View Left [[id number]] Resulted: 01/13/25 1221     Updated: 01/13/25 1221    Narrative:      X-Ray Report:  Study: X-rays ordered, taken in the office, and reviewed today.   Site: Left knee Xray  Indication: Left total knee arthroplasty follow up.   View: AP/Lateral, Standing, and Snover view(s)  Findings: Intact left total knee arthroplasty. No evidence of hardware   malfunction, complication or loosening. No periprosthetic " fractures   visualized. Patella is midline tracking on sunrise view.   Prior studies available for comparison: yes                    Assessment and Plan   Diagnoses and all orders for this visit:    1. History of total knee arthroplasty, left (Primary)  -     XR Knee 3 View Left    2. Right knee pain, unspecified chronicity    3. Osteoarthritis of right knee, unspecified osteoarthritis type    4. Left knee pain, unspecified chronicity         Ramon Holman is 2 year(s) post-op left total knee arthroplasty performed by Dr Vanegas on 1/10/2023. X-rays reviewed, showing hardware intact and no complications.  Patient is doing well. Continue home exercise program to progress strength and ROM.     Discussed the importance of lifelong antibiotic prophylaxis with dental procedures following total joint replacement. In the event of a dental procedure, patient is welcome to contact our office to obtain antibiotics prior to the procedure if their dentist does not provide the prescription.     We also discussed that if a fall/injury were to occur to the affected extremity was advised for patient to obtain x-rays for clarification that no hardware has been compromised or if showing signs of loosening.    Patient verbalized understanding.     Follow-up in 1 year. We will repeat xray's of the prosthetic joint at that time.   Call sooner or return to care, if needed with any changes or concerns.      Submit for viscosupplementation today in office for his right knee.  Eligible mid February.      Tobacco Use: Low Risk  (1/13/2025)    Patient History     Smoking Tobacco Use: Never     Smokeless Tobacco Use: Never     Passive Exposure: Not on file     Patient reports that they are a nonsmoker; cessation education not applicable.            Follow Up   No follow-ups on file.  There are no Patient Instructions on file for this visit.    Patient was given instructions and counseling regarding his condition or for health maintenance  advice. Please see specific information pulled into the AVS if appropriate.       Dictated Utilizing Dragon Dictation. Please note that portions of this note were completed with a voice recognition program. Part of this note may be an electronic transcription/translation of spoken language to printed text using the Dragon Dictation System.

## 2025-01-24 ENCOUNTER — TELEPHONE (OUTPATIENT)
Dept: ORTHOPEDIC SURGERY | Facility: CLINIC | Age: 70
End: 2025-01-24
Payer: MEDICARE

## 2025-01-24 NOTE — TELEPHONE ENCOUNTER
----- Message from Sushila MOISE sent at 1/21/2025 11:14 AM EST -----  Patient has been approved for right knee Euflexxa for dates:  02/16/25 to 07/13/25.  Okay to schedule when appropriate.  Auth #:  [id number]

## 2025-01-24 NOTE — TELEPHONE ENCOUNTER
APPT: RT KNEE EUFLEXXA INJ WITH DAINA  2-26 AT 2:00 PM  3-5 AT 2:00 PM  3-12 AT 2:00 PM    LAST RT KNEE EUFLEXXA INJ:  8-24    DANA KIM=AUTH EXP: 7-13-25  ENTERED AUTH #[id number]

## 2025-03-05 ENCOUNTER — OFFICE VISIT (OUTPATIENT)
Dept: ORTHOPEDIC SURGERY | Facility: CLINIC | Age: 70
End: 2025-03-05
Payer: MEDICARE

## 2025-03-05 VITALS
HEIGHT: 71 IN | WEIGHT: 188 LBS | HEART RATE: 70 BPM | BODY MASS INDEX: 26.32 KG/M2 | SYSTOLIC BLOOD PRESSURE: 145 MMHG | OXYGEN SATURATION: 96 % | DIASTOLIC BLOOD PRESSURE: 77 MMHG

## 2025-03-05 DIAGNOSIS — M17.11 OSTEOARTHRITIS OF RIGHT KNEE, UNSPECIFIED OSTEOARTHRITIS TYPE: Primary | ICD-10-CM

## 2025-03-05 DIAGNOSIS — M25.561 RIGHT KNEE PAIN, UNSPECIFIED CHRONICITY: ICD-10-CM

## 2025-03-05 RX ORDER — HYALURONATE SODIUM 10 MG/ML
20 SYRINGE (ML) INTRAARTICULAR
Status: COMPLETED | OUTPATIENT
Start: 2025-03-05 | End: 2025-03-05

## 2025-03-05 RX ADMIN — Medication 20 MG: at 14:07

## 2025-03-05 NOTE — PROGRESS NOTES
"Chief Complaint  Follow-up of the Right Knee     Subjective      Ramon Holman is a 70 y.o. male who presents to [facility] for follow up on right knee. Patient has right knee osteoarthritis that we have been managing conservatively with injections. Patient's is here today for Euflexxa series shot # 1 of 3.     No Known Allergies     Social History     Socioeconomic History    Marital status:    Tobacco Use    Smoking status: Never    Smokeless tobacco: Never   Vaping Use    Vaping status: Never Used   Substance and Sexual Activity    Alcohol use: Not Currently    Drug use: Never    Sexual activity: Defer        Tobacco Use: Low Risk  (3/5/2025)    Patient History     Smoking Tobacco Use: Never     Smokeless Tobacco Use: Never     Passive Exposure: Not on file     Patient reports that they are a nonsmoker; cessation education not applicable.     I reviewed the patient's chief complaint, history of present illness, review of systems, past medical history, surgical history, family history, social history, medications, and allergy list.     Review of Systems     Constitutional: Denies fevers, chills, weight loss  Cardiovascular: Denies chest pain, shortness of breath  Skin: Denies rashes, acute skin changes  Neurologic: Denies headache, loss of consciousness    Vital Signs:   /77   Pulse 70   Ht 180.3 cm (70.98\")   Wt 85.3 kg (188 lb)   SpO2 96%   BMI 26.23 kg/m²          Physical Exam  General: Alert. No acute distress    Ortho Exam      Right knee: Stable valgus/varus.  0 degrees extension.  120 degrees flexion.  Mild swelling noted.     Large Joint: R knee  Date/Time: 3/5/2025 2:07 PM  Consent given by: patient  Site marked: site marked  Timeout: Immediately prior to procedure a time out was called to verify the correct patient, procedure, equipment, support staff and site/side marked as required   Supporting Documentation  Indications: pain   Procedure " Details  Location: knee - R knee  Preparation: Patient was prepped and draped in the usual sterile fashion  Needle gauge: 21 G.  Medications administered: 20 mg Sodium Hyaluronate (Viscosup) 20 MG/2ML  Patient tolerance: patient tolerated the procedure well with no immediate complications      This injection documentation was Scribed for DULCE Gonzalez by Hannah Toth MA.  03/05/25   14:08 EST   Imaging Results (Most Recent)       None             Result Review :       No results found.          Assessment and Plan     Diagnoses and all orders for this visit:    1. Osteoarthritis of right knee, unspecified osteoarthritis type (Primary)    2. Right knee pain, unspecified chronicity    Other orders  -     Large Joint: R knee        Patient is previously aware of risks, benefits and alternatives of injections. Patient was informed of possible adverse effects including but not limited to bleeding, damage to nerve, tendon or artery, increased blood sugar and increased blood pressure. Discussed possibility of a reaction from the injection.  Discussed the possibility that the injection may not completely improve or remove the pain.  Discussed the risk of infection.  Discussed the possibility of worsening pain after the injection.  Informed consent obtained.  Time out was performed. Patient was placed with knee in flexion at 90 degrees. Site marked inferior and lateral to patella.  Chlorhexidine was swabbed at injection site per typical technique. Needle injected into bursa, aspiration was performed and then First  right knee Euflexxa injection administered in office today. Needle was removed, band-aid placed to injection site.  Patient tolerated injection well with no complications.     Follow-up in 1 week for next injection.  Call with changes or concerns.    Patient was given instructions and counseling regarding his condition or for health maintenance advice. Please see specific information pulled into the  AVS if appropriate.     Rohini Ta, DULCE   03/05/2025  12:35 EST    Dictated Utilizing Dragon Dictation. Please note that portions of this note were completed with a voice recognition program. Part of this note may be an electronic transcription/translation of spoken language to printed text using the Dragon Dictation System.

## 2025-03-12 ENCOUNTER — OFFICE VISIT (OUTPATIENT)
Dept: ORTHOPEDIC SURGERY | Facility: CLINIC | Age: 70
End: 2025-03-12
Payer: MEDICARE

## 2025-03-12 VITALS — OXYGEN SATURATION: 97 % | WEIGHT: 188 LBS | HEART RATE: 68 BPM | BODY MASS INDEX: 26.32 KG/M2 | HEIGHT: 71 IN

## 2025-03-12 DIAGNOSIS — M25.561 RIGHT KNEE PAIN, UNSPECIFIED CHRONICITY: ICD-10-CM

## 2025-03-12 DIAGNOSIS — M17.11 OSTEOARTHRITIS OF RIGHT KNEE, UNSPECIFIED OSTEOARTHRITIS TYPE: Primary | ICD-10-CM

## 2025-03-12 RX ADMIN — Medication 20 MG: at 14:10

## 2025-03-12 NOTE — PROGRESS NOTES
"Chief Complaint  Follow-up and Pain of the Right Knee     Subjective      Ramon Holman is a 70 y.o. male who presents to [facility] for follow up on his right knee. Patient has right knee osteoarthritis that we have been managing conservatively with injections. Patient's is here today for Euflexxa series shot # 2 of 3.     No Known Allergies     Social History     Socioeconomic History    Marital status:    Tobacco Use    Smoking status: Never    Smokeless tobacco: Never   Vaping Use    Vaping status: Never Used   Substance and Sexual Activity    Alcohol use: Not Currently    Drug use: Never    Sexual activity: Defer        Tobacco Use: Low Risk  (3/12/2025)    Patient History     Smoking Tobacco Use: Never     Smokeless Tobacco Use: Never     Passive Exposure: Not on file     Patient reports that they are a nonsmoker; cessation education not applicable.     I reviewed the patient's chief complaint, history of present illness, review of systems, past medical history, surgical history, family history, social history, medications, and allergy list.     Review of Systems     Constitutional: Denies fevers, chills, weight loss  Cardiovascular: Denies chest pain, shortness of breath  Skin: Denies rashes, acute skin changes  Neurologic: Denies headache, loss of consciousness    Vital Signs:   Pulse 68   Ht 180.3 cm (71\")   Wt 85.3 kg (188 lb)   SpO2 97%   BMI 26.22 kg/m²          Physical Exam  General: Alert. No acute distress    Ortho Exam      right Knee: Stable valgus/varus. 0 degrees extension. 120 degrees flexion. mild swelling noted.     Right knee injection: R knee  Date/Time: 3/12/2025 2:10 PM  Consent given by: patient  Site marked: site marked  Timeout: Immediately prior to procedure a time out was called to verify the correct patient, procedure, equipment, support staff and site/side marked as required   Supporting Documentation  Indications: pain   Procedure " Details  Location: knee - R knee  Needle gauge: 21g.  Medications administered: 20 mg Sodium Hyaluronate (Viscosup) 20 MG/2ML  Patient tolerance: patient tolerated the procedure well with no immediate complications      This injection documentation was Scribed for DULCE Gonzalez by Treasure Pascual MA.  03/12/25   14:11 EDT    Imaging Results (Most Recent)       None             Result Review :       No results found.          Assessment and Plan     Diagnoses and all orders for this visit:    1. Osteoarthritis of right knee, unspecified osteoarthritis type (Primary)    2. Right knee pain, unspecified chronicity    Other orders  -     right knee injection: R knee        Patient is previously aware of risks, benefits and alternatives of injections. Patient was informed of possible adverse effects including but not limited to bleeding, damage to nerve, tendon or artery, increased blood sugar and increased blood pressure. Discussed possibility of a reaction from the injection.  Discussed the possibility that the injection may not completely improve or remove the pain.  Discussed the risk of infection.  Discussed the possibility of worsening pain after the injection.  Informed consent obtained.  Time out was performed. Patient was placed with knee in flexion at 90 degrees. Site marked inferior and lateral to patella.  Chlorhexidine was swabbed at injection site per typical technique. Needle injected into bursa, aspiration was performed and then Second right knee Euflexxa injection administered in office today. Needle was removed, band-aid placed to injection site.  Patient tolerated injection well with no complications.        Follow-up in 1 week for next injection.  Call with changes or concerns.    Patient was given instructions and counseling regarding his condition or for health maintenance advice. Please see specific information pulled into the AVS if appropriate.     DULCE Gonzalez   03/12/2025  12:49  EDT    Dictated Utilizing Dragon Dictation. Please note that portions of this note were completed with a voice recognition program. Part of this note may be an electronic transcription/translation of spoken language to printed text using the Dragon Dictation System.

## 2025-03-15 RX ORDER — HYALURONATE SODIUM 10 MG/ML
20 SYRINGE (ML) INTRAARTICULAR
Status: COMPLETED | OUTPATIENT
Start: 2025-03-12 | End: 2025-03-12

## 2025-03-19 ENCOUNTER — OFFICE VISIT (OUTPATIENT)
Dept: ORTHOPEDIC SURGERY | Facility: CLINIC | Age: 70
End: 2025-03-19
Payer: MEDICARE

## 2025-03-19 VITALS
OXYGEN SATURATION: 95 % | HEIGHT: 71 IN | HEART RATE: 66 BPM | WEIGHT: 188 LBS | BODY MASS INDEX: 26.32 KG/M2 | DIASTOLIC BLOOD PRESSURE: 72 MMHG | SYSTOLIC BLOOD PRESSURE: 130 MMHG

## 2025-03-19 DIAGNOSIS — M25.561 RIGHT KNEE PAIN, UNSPECIFIED CHRONICITY: ICD-10-CM

## 2025-03-19 DIAGNOSIS — M17.11 OSTEOARTHRITIS OF RIGHT KNEE, UNSPECIFIED OSTEOARTHRITIS TYPE: Primary | ICD-10-CM

## 2025-03-19 RX ORDER — HYALURONATE SODIUM 10 MG/ML
20 SYRINGE (ML) INTRAARTICULAR
Status: COMPLETED | OUTPATIENT
Start: 2025-03-19 | End: 2025-03-19

## 2025-03-19 RX ADMIN — Medication 20 MG: at 14:03

## 2025-03-19 NOTE — PROGRESS NOTES
"Chief Complaint  Follow-up of the Right Knee    Subjective      Ramon Holman presents to [facility] for follow up of their right knee.  Patient has right knee pain and osteoarthritis that he manages conservatively with interim injections.  He is here today to receive Euflexxa injection #3 of 3    No Known Allergies    Objective     Vital Signs:   Vitals:    03/19/25 1400   BP: 130/72   Pulse: 66   SpO2: 95%   Weight: 85.3 kg (188 lb)   Height: 180.3 cm (70.98\")     Body mass index is 26.23 kg/m².    I reviewed the patient's chief complaint, history of present illness, review of systems, past medical history, surgical history, family history, social history, medications, and allergy list.     Ortho Exam  Knee   General: Alert, no acute distress.   Right knee: No pain with passive hip ROM.   Knee stable to varus/valgus stress.  Knee extensor mechanism intact.  0 degrees knee extension. Flexion to 125 degrees. Calf soft, non-tender.  Sensation and neurovascularly intact.  Demonstrates active ankle dorsiflexion and plantarflexion.  Palpable pedal pulses.         Large Joint: R knee  Date/Time: 3/19/2025 2:03 PM  Consent given by: patient  Site marked: site marked  Timeout: Immediately prior to procedure a time out was called to verify the correct patient, procedure, equipment, support staff and site/side marked as required   Supporting Documentation  Indications: pain   Procedure Details  Location: knee - R knee  Preparation: Patient was prepped and draped in the usual sterile fashion  Needle gauge: 21 G.  Medications administered: 20 mg Sodium Hyaluronate (Viscosup) 20 MG/2ML  Patient tolerance: patient tolerated the procedure well with no immediate complications         This injection documentation was Scribed for DULCE Gonzalez by Hannah Toth MA.  03/19/25   14:04 EDT   Imaging Results (Most Recent)       None                Assessment and Plan   Diagnoses and all orders " KEITH Matthews for Ozempic has been denied.    Patient needs to have tried and failed two of the following:      Isaura Bennett   for this visit:    1. Osteoarthritis of right knee, unspecified osteoarthritis type (Primary)    2. Right knee pain, unspecified chronicity    Other orders  -     Large Joint: R knee         Ramon Holman presents today to [facility] for the follow up of their right knee. They have knee pain osteoarthritis that we have been treating conservatively with intermittent injections.  He is here today to receive Euflexxa injection #3 of 3.  He has been tolerating the series well.     Patient was informed of possible adverse effects including but not limited to bleeding, damage to nerve, tendon or artery, increased blood sugar and increased blood pressure. Discussed possibility of a reaction from the injection.  Discussed the possibility that the injection may not completely improve or remove the pain.  Discussed the risk of infection.  Discussed the possibility of worsening pain after the injection.  Informed consent obtained.  Time out was performed. Patient was placed with knee in flexion at 90 degrees. Site marked inferior and lateral to patella.  Chlorhexidine was swabbed at injection site per typical technique. Needle injected into bursa, aspiration was performed and then right knee Euflexxa injection #3 slowly injected into joint space, fluid was free flowing. Needle was removed, band-aid placed to injection site.  Patient tolerated injection well with no complications.     Follow up as needed.  Eligible for repeat Euflexxa injection series to begin on or after September 20 with prior authorization approval.  Advised patient to contact the office in August for submission of authorization approval        Tobacco Use: Low Risk  (3/19/2025)    Patient History     Smoking Tobacco Use: Never     Smokeless Tobacco Use: Never     Passive Exposure: Not on file     Patient reports that they are a nonsmoker; cessation education not applicable.            Follow Up   No follow-ups on file.  There are no Patient  Instructions on file for this visit.    Patient was given instructions and counseling regarding his condition or for health maintenance advice. Please see specific information pulled into the AVS if appropriate.     Dictated Utilizing Dragon Dictation. Please note that portions of this note were completed with a voice recognition program. Part of this note may be an electronic transcription/translation of spoken language to printed text using the Dragon Dictation System.

## (undated) DEVICE — GAUZE,SPONGE,4"X4",16PLY,STRL,LF,10/TRAY: Brand: MEDLINE

## (undated) DEVICE — SUT ETHIB 1 X538H ETX538H

## (undated) DEVICE — SUT VIC 0 CT1 36IN J946H

## (undated) DEVICE — TOTAL KNEE-LF: Brand: MEDLINE INDUSTRIES, INC.

## (undated) DEVICE — MAT FLR ABS W/BLU/LINER 56X72IN WHT

## (undated) DEVICE — NO-SCRATCH ™ SMALL WHITNEY CURETTE ™ IS A SINGLE-USE, PLASTIC CURETTE FOR QUICKLY APPLYING, MANIPULATING AND REMOVING BONE CEMENT DURING HIP AND KNEE REPLACEMENT SURGERY. THE PLASTIC IS SOFTER THAN STEEL INSTRUMENTS, REDUCING THE RISK OF DAMAGING THE PROSTHESIS WITH METAL INSTRUMENTS.  THE CURETTE’S 6MM TIP REMOVES EXCESS CEMENT FROM REPLACEMENT HIPS AND KNEES. EASY-TO-MANEUVER, THE SMALL BLUE CURETTE LETS YOU REMOVE CEMENT FROM ALL EDGES OF THE PROSTHESIS.NO-SCRATCH WHITNEY SMALL CURETTE FEATURES:SAFER THAN STEEL- MADE OF PLASTIC - STURDY YET SOFTER THAN SURGICAL STEEL.HANDIER- EACH TOOL HAS A MOLDED-IN THUMB INDENTATION INSTANTLY ORIENTING THE TOOL.- EASIER TO MANEUVER IN HARD TO SEE PLACES.- COLOR-CODED FOR EASY IDENTIFICATION.FASTER- COMES INDIVIDUALLY PACKAGED IN STERILE, PEEL OPEN POUCH, READY TO GO.- APPLIES, MANIPULATES, OR REMOVES CEMENT WITH FINGERTIP PRECISION.ECONOMICAL- THE COST OF A SINGLE REVISION DWARFS THE COST OF A SINGLE-USE CURETTE. - DISPOSABLE – THERE’S NO NEED TO WASTE TIME REMOVING HARDENED CEMENT OR RE-STERILIZING TOOLS.- LESS EXPENSIVE TO BUY AND INVENTORY - ORDER ONLY THE TOOL YOU USE.- PACKAGED 25 INDIVIDUALLY WRAPPED TOOLS TO A CARTON FOR CONVENIENT SHELF STORAGE.: Brand: WHITNEY NO-SCRATCH CURETTE (SMALL)

## (undated) DEVICE — DRSNG SURESITE WNDW 4X4.5

## (undated) DEVICE — SUT VIC UD BR COAT 0 CP2 27IN

## (undated) DEVICE — BASIC SINGLE BASIN-LF: Brand: MEDLINE INDUSTRIES, INC.

## (undated) DEVICE — PULLOVER TOGA, 2X LARGE: Brand: FLYTE, SURGICOOL

## (undated) DEVICE — CVR LEG BOOTLEG F/R NOSKID 33IN

## (undated) DEVICE — 3 BONE CEMENT MIXER: Brand: MIXEVAC

## (undated) DEVICE — DRSNG PAD ABD 8X10IN STRL

## (undated) DEVICE — SOL IRR NACL 0.9PCT 3000ML

## (undated) DEVICE — SYR LUERLOK 30CC

## (undated) DEVICE — DISPOSABLE TOURNIQUET CUFF SINGLE BLADDER, SINGLE PORT AND QUICK CONNECT CONNECTOR: Brand: COLOR CUFF

## (undated) DEVICE — 3M™ STERI-DRAPE™ U-DRAPE 1015: Brand: STERI-DRAPE™

## (undated) DEVICE — SCRW HEX PERSONA FML 2.5X25MM PK/2
Type: IMPLANTABLE DEVICE | Site: KNEE | Status: NON-FUNCTIONAL
Removed: 2023-01-10

## (undated) DEVICE — GLV SURG SENSICARE PI PF LF 7 GRN STRL

## (undated) DEVICE — PROXIMATE RH ROTATING HEAD SKIN STAPLERS (35 WIDE) CONTAINS 35 STAINLESS STEEL STAPLES: Brand: PROXIMATE

## (undated) DEVICE — DRSNG GZ PETROLTM XEROFORM CURAD 1X8IN STRL

## (undated) DEVICE — APPL CHLORAPREP HI/LITE 26ML ORNG

## (undated) DEVICE — 450 ML BOTTLE OF 0.05% CHLORHEXIDINE GLUCONATE IN 99.95% STERILE WATER FOR IRRIGATION, USP AND APPLICATOR.: Brand: IRRISEPT ANTIMICROBIAL WOUND LAVAGE

## (undated) DEVICE — FAN SPRAY KIT: Brand: PULSAVAC®

## (undated) DEVICE — GLV SURG ULTRAFREE MAX LTX PF 8

## (undated) DEVICE — UNDYED BRAIDED (POLYGLACTIN 910), SYNTHETIC ABSORBABLE SUTURE: Brand: COATED VICRYL

## (undated) DEVICE — PEEL-AWAY TOGA, 2X LARGE: Brand: FLYTE

## (undated) DEVICE — ENCORE® LATEX ORTHO SIZE 8, STERILE LATEX POWDER-FREE SURGICAL GLOVE: Brand: ENCORE

## (undated) DEVICE — STERILE PATIENT PROTECTIVE PAD FOR IMP® KNEE POSITIONERS & COHESIVE WRAP (10 / CASE): Brand: DE MAYO KNEE POSITIONER®

## (undated) DEVICE — ELECTRD BLD EDGE COAT 3IN

## (undated) DEVICE — STRYKER PERFORMANCE SERIES SAGITTAL BLADE: Brand: STRYKER PERFORMANCE SERIES

## (undated) DEVICE — INTENDED FOR TISSUE SEPARATION, AND OTHER PROCEDURES THAT REQUIRE A SHARP SURGICAL BLADE TO PUNCTURE OR CUT.: Brand: BARD-PARKER ® CARBON RIB-BACK BLADES

## (undated) DEVICE — SLV SCD KN/LEN ADJ EXPRSS BLENDED MD 1P/U

## (undated) DEVICE — GLV SURG SENSICARE SLT PF LF 7 STRL

## (undated) DEVICE — DRP ROBOTIC ROSA BX/20

## (undated) DEVICE — NDL HYPO ECLPS SFTY 18G 1 1/2IN

## (undated) DEVICE — PENCL E/S SMOKEEVAC W/TELESCP CANN

## (undated) DEVICE — TOWEL,OR,DSP,ST,BLUE,STD,4/PK,20PK/CS: Brand: MEDLINE